# Patient Record
Sex: FEMALE | Race: WHITE | NOT HISPANIC OR LATINO | Employment: OTHER | URBAN - METROPOLITAN AREA
[De-identification: names, ages, dates, MRNs, and addresses within clinical notes are randomized per-mention and may not be internally consistent; named-entity substitution may affect disease eponyms.]

---

## 2024-11-01 ENCOUNTER — APPOINTMENT (OUTPATIENT)
Dept: RADIOLOGY | Facility: HOSPITAL | Age: 34
End: 2024-11-01
Payer: COMMERCIAL

## 2024-11-01 ENCOUNTER — HOSPITAL ENCOUNTER (EMERGENCY)
Facility: HOSPITAL | Age: 34
Discharge: HOME | End: 2024-11-01
Payer: COMMERCIAL

## 2024-11-01 VITALS
OXYGEN SATURATION: 94 % | RESPIRATION RATE: 16 BRPM | HEART RATE: 96 BPM | HEIGHT: 60 IN | WEIGHT: 120 LBS | SYSTOLIC BLOOD PRESSURE: 107 MMHG | BODY MASS INDEX: 23.56 KG/M2 | DIASTOLIC BLOOD PRESSURE: 62 MMHG | TEMPERATURE: 100.4 F

## 2024-11-01 DIAGNOSIS — U07.1 COVID: Primary | ICD-10-CM

## 2024-11-01 LAB
ALBUMIN SERPL BCP-MCNC: 4 G/DL (ref 3.4–5)
ALP SERPL-CCNC: 43 U/L (ref 33–110)
ALT SERPL W P-5'-P-CCNC: 24 U/L (ref 7–45)
ANION GAP SERPL CALC-SCNC: 12 MMOL/L (ref 10–20)
AST SERPL W P-5'-P-CCNC: 20 U/L (ref 9–39)
BASOPHILS # BLD AUTO: 0.01 X10*3/UL (ref 0–0.1)
BASOPHILS NFR BLD AUTO: 0.2 %
BILIRUB SERPL-MCNC: 0.4 MG/DL (ref 0–1.2)
BUN SERPL-MCNC: 8 MG/DL (ref 6–23)
CALCIUM SERPL-MCNC: 8.4 MG/DL (ref 8.6–10.3)
CHLORIDE SERPL-SCNC: 101 MMOL/L (ref 98–107)
CO2 SERPL-SCNC: 27 MMOL/L (ref 21–32)
CREAT SERPL-MCNC: 0.68 MG/DL (ref 0.5–1.05)
EGFRCR SERPLBLD CKD-EPI 2021: >90 ML/MIN/1.73M*2
EOSINOPHIL # BLD AUTO: 0.02 X10*3/UL (ref 0–0.7)
EOSINOPHIL NFR BLD AUTO: 0.5 %
ERYTHROCYTE [DISTWIDTH] IN BLOOD BY AUTOMATED COUNT: 12.8 % (ref 11.5–14.5)
FLUAV RNA RESP QL NAA+PROBE: NOT DETECTED
FLUBV RNA RESP QL NAA+PROBE: NOT DETECTED
GLUCOSE SERPL-MCNC: 94 MG/DL (ref 74–99)
HCT VFR BLD AUTO: 35.3 % (ref 36–46)
HGB BLD-MCNC: 12.1 G/DL (ref 12–16)
HOLD SPECIMEN: NORMAL
HOLD SPECIMEN: NORMAL
IMM GRANULOCYTES # BLD AUTO: 0.02 X10*3/UL (ref 0–0.7)
IMM GRANULOCYTES NFR BLD AUTO: 0.5 % (ref 0–0.9)
LACTATE SERPL-SCNC: 0.9 MMOL/L (ref 0.4–2)
LYMPHOCYTES # BLD AUTO: 0.4 X10*3/UL (ref 1.2–4.8)
LYMPHOCYTES NFR BLD AUTO: 9.4 %
MCH RBC QN AUTO: 31.4 PG (ref 26–34)
MCHC RBC AUTO-ENTMCNC: 34.3 G/DL (ref 32–36)
MCV RBC AUTO: 92 FL (ref 80–100)
MONOCYTES # BLD AUTO: 0.17 X10*3/UL (ref 0.1–1)
MONOCYTES NFR BLD AUTO: 4 %
NEUTROPHILS # BLD AUTO: 3.65 X10*3/UL (ref 1.2–7.7)
NEUTROPHILS NFR BLD AUTO: 85.4 %
NRBC BLD-RTO: 0 /100 WBCS (ref 0–0)
PLATELET # BLD AUTO: 166 X10*3/UL (ref 150–450)
POTASSIUM SERPL-SCNC: 3.3 MMOL/L (ref 3.5–5.3)
PROT SERPL-MCNC: 7.2 G/DL (ref 6.4–8.2)
RBC # BLD AUTO: 3.85 X10*6/UL (ref 4–5.2)
SODIUM SERPL-SCNC: 137 MMOL/L (ref 136–145)
WBC # BLD AUTO: 4.3 X10*3/UL (ref 4.4–11.3)

## 2024-11-01 PROCEDURE — 71046 X-RAY EXAM CHEST 2 VIEWS: CPT | Performed by: RADIOLOGY

## 2024-11-01 PROCEDURE — 87502 INFLUENZA DNA AMP PROBE: CPT | Performed by: NURSE PRACTITIONER

## 2024-11-01 PROCEDURE — 85025 COMPLETE CBC W/AUTO DIFF WBC: CPT | Performed by: NURSE PRACTITIONER

## 2024-11-01 PROCEDURE — 99283 EMERGENCY DEPT VISIT LOW MDM: CPT | Mod: 25

## 2024-11-01 PROCEDURE — 36415 COLL VENOUS BLD VENIPUNCTURE: CPT | Performed by: NURSE PRACTITIONER

## 2024-11-01 PROCEDURE — 2500000002 HC RX 250 W HCPCS SELF ADMINISTERED DRUGS (ALT 637 FOR MEDICARE OP, ALT 636 FOR OP/ED): Performed by: NURSE PRACTITIONER

## 2024-11-01 PROCEDURE — 83605 ASSAY OF LACTIC ACID: CPT | Performed by: NURSE PRACTITIONER

## 2024-11-01 PROCEDURE — 71046 X-RAY EXAM CHEST 2 VIEWS: CPT

## 2024-11-01 PROCEDURE — 2500000001 HC RX 250 WO HCPCS SELF ADMINISTERED DRUGS (ALT 637 FOR MEDICARE OP): Performed by: NURSE PRACTITIONER

## 2024-11-01 PROCEDURE — 80053 COMPREHEN METABOLIC PANEL: CPT | Performed by: NURSE PRACTITIONER

## 2024-11-01 PROCEDURE — 87040 BLOOD CULTURE FOR BACTERIA: CPT | Mod: PARLAB | Performed by: NURSE PRACTITIONER

## 2024-11-01 RX ORDER — IBUPROFEN 400 MG/1
400 TABLET ORAL ONCE
Status: COMPLETED | OUTPATIENT
Start: 2024-11-01 | End: 2024-11-01

## 2024-11-01 RX ORDER — ALBUTEROL SULFATE 90 UG/1
2 INHALANT RESPIRATORY (INHALATION) EVERY 4 HOURS PRN
Qty: 18 G | Refills: 0 | Status: ON HOLD | OUTPATIENT
Start: 2024-11-01 | End: 2024-12-01

## 2024-11-01 RX ORDER — ONDANSETRON 4 MG/1
4 TABLET, ORALLY DISINTEGRATING ORAL EVERY 8 HOURS PRN
Qty: 20 TABLET | Refills: 0 | Status: ON HOLD | OUTPATIENT
Start: 2024-11-01 | End: 2024-11-08

## 2024-11-01 RX ORDER — BENZONATATE 100 MG/1
100 CAPSULE ORAL EVERY 8 HOURS
Qty: 21 CAPSULE | Refills: 0 | Status: ON HOLD | OUTPATIENT
Start: 2024-11-01 | End: 2024-11-08

## 2024-11-01 RX ORDER — POTASSIUM CHLORIDE 20 MEQ/1
40 TABLET, EXTENDED RELEASE ORAL ONCE
Status: COMPLETED | OUTPATIENT
Start: 2024-11-01 | End: 2024-11-01

## 2024-11-01 RX ORDER — ACETAMINOPHEN 325 MG/1
975 TABLET ORAL ONCE
Status: COMPLETED | OUTPATIENT
Start: 2024-11-01 | End: 2024-11-01

## 2024-11-01 ASSESSMENT — COLUMBIA-SUICIDE SEVERITY RATING SCALE - C-SSRS
1. IN THE PAST MONTH, HAVE YOU WISHED YOU WERE DEAD OR WISHED YOU COULD GO TO SLEEP AND NOT WAKE UP?: NO
6. HAVE YOU EVER DONE ANYTHING, STARTED TO DO ANYTHING, OR PREPARED TO DO ANYTHING TO END YOUR LIFE?: NO
2. HAVE YOU ACTUALLY HAD ANY THOUGHTS OF KILLING YOURSELF?: NO

## 2024-11-03 LAB
BACTERIA BLD CULT: NORMAL
BACTERIA BLD CULT: NORMAL

## 2024-11-05 LAB
BACTERIA BLD CULT: NORMAL
BACTERIA BLD CULT: NORMAL

## 2024-11-10 ENCOUNTER — HOSPITAL ENCOUNTER (INPATIENT)
Facility: HOSPITAL | Age: 34
End: 2024-11-10
Attending: EMERGENCY MEDICINE | Admitting: INTERNAL MEDICINE
Payer: COMMERCIAL

## 2024-11-10 ENCOUNTER — APPOINTMENT (OUTPATIENT)
Dept: RADIOLOGY | Facility: HOSPITAL | Age: 34
End: 2024-11-10
Payer: COMMERCIAL

## 2024-11-10 ENCOUNTER — APPOINTMENT (OUTPATIENT)
Dept: CARDIOLOGY | Facility: HOSPITAL | Age: 34
End: 2024-11-10
Payer: COMMERCIAL

## 2024-11-10 VITALS
DIASTOLIC BLOOD PRESSURE: 72 MMHG | SYSTOLIC BLOOD PRESSURE: 119 MMHG | HEART RATE: 91 BPM | BODY MASS INDEX: 23.56 KG/M2 | TEMPERATURE: 98.2 F | RESPIRATION RATE: 16 BRPM | OXYGEN SATURATION: 80 % | WEIGHT: 120 LBS | HEIGHT: 60 IN

## 2024-11-10 DIAGNOSIS — J18.9 PNEUMONIA OF BOTH LUNGS DUE TO INFECTIOUS ORGANISM, UNSPECIFIED PART OF LUNG: Primary | ICD-10-CM

## 2024-11-10 LAB
ALBUMIN SERPL BCP-MCNC: 3.1 G/DL (ref 3.4–5)
ALP SERPL-CCNC: 71 U/L (ref 33–110)
ALT SERPL W P-5'-P-CCNC: 30 U/L (ref 7–45)
ANION GAP SERPL CALC-SCNC: 14 MMOL/L (ref 10–20)
AST SERPL W P-5'-P-CCNC: 36 U/L (ref 9–39)
B-HCG SERPL-ACNC: <2 MIU/ML
BASOPHILS # BLD AUTO: 0.02 X10*3/UL (ref 0–0.1)
BASOPHILS NFR BLD AUTO: 0.2 %
BILIRUB SERPL-MCNC: 0.5 MG/DL (ref 0–1.2)
BNP SERPL-MCNC: 69 PG/ML (ref 0–99)
BUN SERPL-MCNC: 5 MG/DL (ref 6–23)
CALCIUM SERPL-MCNC: 8.1 MG/DL (ref 8.6–10.3)
CARDIAC TROPONIN I PNL SERPL HS: 9 NG/L (ref 0–13)
CARDIAC TROPONIN I PNL SERPL HS: 9 NG/L (ref 0–13)
CHLORIDE SERPL-SCNC: 104 MMOL/L (ref 98–107)
CO2 SERPL-SCNC: 26 MMOL/L (ref 21–32)
CREAT SERPL-MCNC: 0.55 MG/DL (ref 0.5–1.05)
EGFRCR SERPLBLD CKD-EPI 2021: >90 ML/MIN/1.73M*2
EOSINOPHIL # BLD AUTO: 0.09 X10*3/UL (ref 0–0.7)
EOSINOPHIL NFR BLD AUTO: 1 %
ERYTHROCYTE [DISTWIDTH] IN BLOOD BY AUTOMATED COUNT: 13.5 % (ref 11.5–14.5)
FLUAV RNA RESP QL NAA+PROBE: NOT DETECTED
FLUBV RNA RESP QL NAA+PROBE: NOT DETECTED
GLUCOSE SERPL-MCNC: 81 MG/DL (ref 74–99)
HCT VFR BLD AUTO: 31.8 % (ref 36–46)
HGB BLD-MCNC: 10.4 G/DL (ref 12–16)
IMM GRANULOCYTES # BLD AUTO: 0.04 X10*3/UL (ref 0–0.7)
IMM GRANULOCYTES NFR BLD AUTO: 0.5 % (ref 0–0.9)
LYMPHOCYTES # BLD AUTO: 0.93 X10*3/UL (ref 1.2–4.8)
LYMPHOCYTES NFR BLD AUTO: 10.5 %
MAGNESIUM SERPL-MCNC: 2.14 MG/DL (ref 1.6–2.4)
MCH RBC QN AUTO: 31.3 PG (ref 26–34)
MCHC RBC AUTO-ENTMCNC: 32.7 G/DL (ref 32–36)
MCV RBC AUTO: 96 FL (ref 80–100)
MONOCYTES # BLD AUTO: 0.32 X10*3/UL (ref 0.1–1)
MONOCYTES NFR BLD AUTO: 3.6 %
NEUTROPHILS # BLD AUTO: 7.47 X10*3/UL (ref 1.2–7.7)
NEUTROPHILS NFR BLD AUTO: 84.2 %
NRBC BLD-RTO: 0 /100 WBCS (ref 0–0)
PLATELET # BLD AUTO: 458 X10*3/UL (ref 150–450)
POTASSIUM SERPL-SCNC: 4.7 MMOL/L (ref 3.5–5.3)
PROT SERPL-MCNC: 6.1 G/DL (ref 6.4–8.2)
RBC # BLD AUTO: 3.32 X10*6/UL (ref 4–5.2)
SARS-COV-2 RNA RESP QL NAA+PROBE: NOT DETECTED
SODIUM SERPL-SCNC: 139 MMOL/L (ref 136–145)
WBC # BLD AUTO: 8.9 X10*3/UL (ref 4.4–11.3)

## 2024-11-10 PROCEDURE — 71045 X-RAY EXAM CHEST 1 VIEW: CPT

## 2024-11-10 PROCEDURE — 93005 ELECTROCARDIOGRAM TRACING: CPT

## 2024-11-10 PROCEDURE — 36415 COLL VENOUS BLD VENIPUNCTURE: CPT

## 2024-11-10 PROCEDURE — 83735 ASSAY OF MAGNESIUM: CPT

## 2024-11-10 PROCEDURE — 2500000004 HC RX 250 GENERAL PHARMACY W/ HCPCS (ALT 636 FOR OP/ED)

## 2024-11-10 PROCEDURE — 96365 THER/PROPH/DIAG IV INF INIT: CPT

## 2024-11-10 PROCEDURE — 2500000004 HC RX 250 GENERAL PHARMACY W/ HCPCS (ALT 636 FOR OP/ED): Performed by: EMERGENCY MEDICINE

## 2024-11-10 PROCEDURE — 2500000001 HC RX 250 WO HCPCS SELF ADMINISTERED DRUGS (ALT 637 FOR MEDICARE OP): Performed by: NURSE PRACTITIONER

## 2024-11-10 PROCEDURE — 84484 ASSAY OF TROPONIN QUANT: CPT

## 2024-11-10 PROCEDURE — 86225 DNA ANTIBODY NATIVE: CPT | Mod: PARLAB | Performed by: STUDENT IN AN ORGANIZED HEALTH CARE EDUCATION/TRAINING PROGRAM

## 2024-11-10 PROCEDURE — 2500000004 HC RX 250 GENERAL PHARMACY W/ HCPCS (ALT 636 FOR OP/ED): Performed by: NURSE PRACTITIONER

## 2024-11-10 PROCEDURE — 87636 SARSCOV2 & INF A&B AMP PRB: CPT

## 2024-11-10 PROCEDURE — 2500000001 HC RX 250 WO HCPCS SELF ADMINISTERED DRUGS (ALT 637 FOR MEDICARE OP)

## 2024-11-10 PROCEDURE — 99223 1ST HOSP IP/OBS HIGH 75: CPT | Performed by: NURSE PRACTITIONER

## 2024-11-10 PROCEDURE — 71275 CT ANGIOGRAPHY CHEST: CPT

## 2024-11-10 PROCEDURE — 71275 CT ANGIOGRAPHY CHEST: CPT | Performed by: STUDENT IN AN ORGANIZED HEALTH CARE EDUCATION/TRAINING PROGRAM

## 2024-11-10 PROCEDURE — 96375 TX/PRO/DX INJ NEW DRUG ADDON: CPT

## 2024-11-10 PROCEDURE — 99285 EMERGENCY DEPT VISIT HI MDM: CPT | Mod: 25

## 2024-11-10 PROCEDURE — 2550000001 HC RX 255 CONTRASTS: Performed by: EMERGENCY MEDICINE

## 2024-11-10 PROCEDURE — 80053 COMPREHEN METABOLIC PANEL: CPT

## 2024-11-10 PROCEDURE — 1200000002 HC GENERAL ROOM WITH TELEMETRY DAILY

## 2024-11-10 PROCEDURE — 84132 ASSAY OF SERUM POTASSIUM: CPT

## 2024-11-10 PROCEDURE — 84702 CHORIONIC GONADOTROPIN TEST: CPT

## 2024-11-10 PROCEDURE — 71045 X-RAY EXAM CHEST 1 VIEW: CPT | Performed by: RADIOLOGY

## 2024-11-10 PROCEDURE — 86038 ANTINUCLEAR ANTIBODIES: CPT | Mod: PARLAB | Performed by: STUDENT IN AN ORGANIZED HEALTH CARE EDUCATION/TRAINING PROGRAM

## 2024-11-10 PROCEDURE — 85025 COMPLETE CBC W/AUTO DIFF WBC: CPT

## 2024-11-10 PROCEDURE — 83880 ASSAY OF NATRIURETIC PEPTIDE: CPT

## 2024-11-10 RX ORDER — ONDANSETRON 4 MG/1
4 TABLET, ORALLY DISINTEGRATING ORAL EVERY 8 HOURS PRN
Status: ACTIVE | OUTPATIENT
Start: 2024-11-10

## 2024-11-10 RX ORDER — L. ACIDOPHILUS/L.BULGARICUS 1MM CELL
1 TABLET ORAL 2 TIMES DAILY
Status: DISPENSED | OUTPATIENT
Start: 2024-11-10

## 2024-11-10 RX ORDER — IPRATROPIUM BROMIDE AND ALBUTEROL SULFATE 2.5; .5 MG/3ML; MG/3ML
3 SOLUTION RESPIRATORY (INHALATION)
Status: DISCONTINUED | OUTPATIENT
Start: 2024-11-10 | End: 2024-11-10

## 2024-11-10 RX ORDER — ACETAMINOPHEN 325 MG/1
975 TABLET ORAL ONCE
Status: COMPLETED | OUTPATIENT
Start: 2024-11-10 | End: 2024-11-10

## 2024-11-10 RX ORDER — GUAIFENESIN 600 MG/1
600 TABLET, EXTENDED RELEASE ORAL 2 TIMES DAILY
Status: DISPENSED | OUTPATIENT
Start: 2024-11-10

## 2024-11-10 RX ORDER — IPRATROPIUM BROMIDE AND ALBUTEROL SULFATE 2.5; .5 MG/3ML; MG/3ML
3 SOLUTION RESPIRATORY (INHALATION) EVERY 2 HOUR PRN
Status: ACTIVE | OUTPATIENT
Start: 2024-11-10

## 2024-11-10 RX ORDER — ALBUTEROL SULFATE 0.83 MG/ML
3 SOLUTION RESPIRATORY (INHALATION) EVERY 2 HOUR PRN
Status: ACTIVE | OUTPATIENT
Start: 2024-11-10

## 2024-11-10 RX ORDER — CEFTRIAXONE 2 G/50ML
2 INJECTION, SOLUTION INTRAVENOUS EVERY 24 HOURS
Status: ACTIVE | OUTPATIENT
Start: 2024-11-11

## 2024-11-10 RX ORDER — ALBUTEROL SULFATE 0.83 MG/ML
3 SOLUTION RESPIRATORY (INHALATION) EVERY 4 HOURS PRN
Status: DISCONTINUED | OUTPATIENT
Start: 2024-11-10 | End: 2024-11-10

## 2024-11-10 RX ORDER — BENZONATATE 100 MG/1
100 CAPSULE ORAL EVERY 8 HOURS PRN
Status: ACTIVE | OUTPATIENT
Start: 2024-11-10

## 2024-11-10 RX ORDER — CEFTRIAXONE 1 G/50ML
1 INJECTION, SOLUTION INTRAVENOUS ONCE
Status: DISCONTINUED | OUTPATIENT
Start: 2024-11-10 | End: 2024-11-10

## 2024-11-10 RX ORDER — CEFTRIAXONE 2 G/50ML
2 INJECTION, SOLUTION INTRAVENOUS ONCE
Status: COMPLETED | OUTPATIENT
Start: 2024-11-10 | End: 2024-11-10

## 2024-11-10 RX ORDER — ENOXAPARIN SODIUM 100 MG/ML
40 INJECTION SUBCUTANEOUS DAILY
Status: ACTIVE | OUTPATIENT
Start: 2024-11-10

## 2024-11-10 RX ORDER — DEXAMETHASONE SODIUM PHOSPHATE 10 MG/ML
6 INJECTION INTRAMUSCULAR; INTRAVENOUS EVERY 24 HOURS
Status: DISPENSED | OUTPATIENT
Start: 2024-11-10

## 2024-11-10 RX ADMIN — DEXAMETHASONE SODIUM PHOSPHATE 6 MG: 10 INJECTION INTRAMUSCULAR; INTRAVENOUS at 21:22

## 2024-11-10 RX ADMIN — GUAIFENESIN 600 MG: 600 TABLET, EXTENDED RELEASE ORAL at 21:22

## 2024-11-10 RX ADMIN — ACETAMINOPHEN 975 MG: 325 TABLET, FILM COATED ORAL at 17:08

## 2024-11-10 RX ADMIN — AZITHROMYCIN DIHYDRATE 500 MG: 500 INJECTION, POWDER, LYOPHILIZED, FOR SOLUTION INTRAVENOUS at 17:08

## 2024-11-10 RX ADMIN — CEFTRIAXONE SODIUM 2 G: 2 INJECTION, SOLUTION INTRAVENOUS at 17:08

## 2024-11-10 RX ADMIN — IOHEXOL 75 ML: 350 INJECTION, SOLUTION INTRAVENOUS at 17:10

## 2024-11-10 RX ADMIN — Medication 1 TABLET: at 21:22

## 2024-11-10 SDOH — SOCIAL STABILITY: SOCIAL INSECURITY
WITHIN THE LAST YEAR, HAVE YOU BEEN RAPED OR FORCED TO HAVE ANY KIND OF SEXUAL ACTIVITY BY YOUR PARTNER OR EX-PARTNER?: NO

## 2024-11-10 SDOH — SOCIAL STABILITY: SOCIAL INSECURITY: WITHIN THE LAST YEAR, HAVE YOU BEEN AFRAID OF YOUR PARTNER OR EX-PARTNER?: NO

## 2024-11-10 SDOH — SOCIAL STABILITY: SOCIAL INSECURITY: DOES ANYONE TRY TO KEEP YOU FROM HAVING/CONTACTING OTHER FRIENDS OR DOING THINGS OUTSIDE YOUR HOME?: NO

## 2024-11-10 SDOH — ECONOMIC STABILITY: FOOD INSECURITY: WITHIN THE PAST 12 MONTHS, YOU WORRIED THAT YOUR FOOD WOULD RUN OUT BEFORE YOU GOT THE MONEY TO BUY MORE.: NEVER TRUE

## 2024-11-10 SDOH — ECONOMIC STABILITY: INCOME INSECURITY: IN THE PAST 12 MONTHS HAS THE ELECTRIC, GAS, OIL, OR WATER COMPANY THREATENED TO SHUT OFF SERVICES IN YOUR HOME?: NO

## 2024-11-10 SDOH — SOCIAL STABILITY: SOCIAL INSECURITY: HAVE YOU HAD ANY THOUGHTS OF HARMING ANYONE ELSE?: NO

## 2024-11-10 SDOH — SOCIAL STABILITY: SOCIAL INSECURITY: HAVE YOU HAD THOUGHTS OF HARMING ANYONE ELSE?: NO

## 2024-11-10 SDOH — SOCIAL STABILITY: SOCIAL INSECURITY: WITHIN THE LAST YEAR, HAVE YOU BEEN HUMILIATED OR EMOTIONALLY ABUSED IN OTHER WAYS BY YOUR PARTNER OR EX-PARTNER?: NO

## 2024-11-10 SDOH — SOCIAL STABILITY: SOCIAL INSECURITY
WITHIN THE LAST YEAR, HAVE YOU BEEN KICKED, HIT, SLAPPED, OR OTHERWISE PHYSICALLY HURT BY YOUR PARTNER OR EX-PARTNER?: NO

## 2024-11-10 SDOH — SOCIAL STABILITY: SOCIAL INSECURITY: ABUSE: ADULT

## 2024-11-10 SDOH — ECONOMIC STABILITY: FOOD INSECURITY: WITHIN THE PAST 12 MONTHS, THE FOOD YOU BOUGHT JUST DIDN'T LAST AND YOU DIDN'T HAVE MONEY TO GET MORE.: NEVER TRUE

## 2024-11-10 SDOH — SOCIAL STABILITY: SOCIAL INSECURITY: WERE YOU ABLE TO COMPLETE ALL THE BEHAVIORAL HEALTH SCREENINGS?: YES

## 2024-11-10 SDOH — SOCIAL STABILITY: SOCIAL INSECURITY: ARE THERE ANY APPARENT SIGNS OF INJURIES/BEHAVIORS THAT COULD BE RELATED TO ABUSE/NEGLECT?: NO

## 2024-11-10 SDOH — SOCIAL STABILITY: SOCIAL INSECURITY: ARE YOU OR HAVE YOU BEEN THREATENED OR ABUSED PHYSICALLY, EMOTIONALLY, OR SEXUALLY BY ANYONE?: NO

## 2024-11-10 SDOH — SOCIAL STABILITY: SOCIAL INSECURITY: DO YOU FEEL ANYONE HAS EXPLOITED OR TAKEN ADVANTAGE OF YOU FINANCIALLY OR OF YOUR PERSONAL PROPERTY?: NO

## 2024-11-10 SDOH — SOCIAL STABILITY: SOCIAL INSECURITY: HAS ANYONE EVER THREATENED TO HURT YOUR FAMILY OR YOUR PETS?: NO

## 2024-11-10 SDOH — SOCIAL STABILITY: SOCIAL INSECURITY: DO YOU FEEL UNSAFE GOING BACK TO THE PLACE WHERE YOU ARE LIVING?: NO

## 2024-11-10 ASSESSMENT — PAIN - FUNCTIONAL ASSESSMENT
PAIN_FUNCTIONAL_ASSESSMENT: 0-10
PAIN_FUNCTIONAL_ASSESSMENT: 0-10

## 2024-11-10 ASSESSMENT — LIFESTYLE VARIABLES
AUDIT-C TOTAL SCORE: 0
SKIP TO QUESTIONS 9-10: 1
HOW OFTEN DO YOU HAVE A DRINK CONTAINING ALCOHOL: NEVER
HOW MANY STANDARD DRINKS CONTAINING ALCOHOL DO YOU HAVE ON A TYPICAL DAY: PATIENT DOES NOT DRINK
AUDIT-C TOTAL SCORE: 0
HOW OFTEN DO YOU HAVE 6 OR MORE DRINKS ON ONE OCCASION: NEVER

## 2024-11-10 ASSESSMENT — ACTIVITIES OF DAILY LIVING (ADL)
DRESSING YOURSELF: INDEPENDENT
HEARING - LEFT EAR: FUNCTIONAL
HEARING - RIGHT EAR: FUNCTIONAL
WALKS IN HOME: INDEPENDENT
PATIENT'S MEMORY ADEQUATE TO SAFELY COMPLETE DAILY ACTIVITIES?: YES
BATHING: INDEPENDENT
FEEDING YOURSELF: INDEPENDENT
JUDGMENT_ADEQUATE_SAFELY_COMPLETE_DAILY_ACTIVITIES: YES
TOILETING: INDEPENDENT
GROOMING: INDEPENDENT
LACK_OF_TRANSPORTATION: NO
ADEQUATE_TO_COMPLETE_ADL: YES

## 2024-11-10 ASSESSMENT — COGNITIVE AND FUNCTIONAL STATUS - GENERAL
MOBILITY SCORE: 24
DAILY ACTIVITIY SCORE: 24
PATIENT BASELINE BEDBOUND: NO

## 2024-11-10 ASSESSMENT — PAIN DESCRIPTION - PAIN TYPE: TYPE: ACUTE PAIN

## 2024-11-10 ASSESSMENT — PATIENT HEALTH QUESTIONNAIRE - PHQ9
2. FEELING DOWN, DEPRESSED OR HOPELESS: NOT AT ALL
SUM OF ALL RESPONSES TO PHQ9 QUESTIONS 1 & 2: 0
1. LITTLE INTEREST OR PLEASURE IN DOING THINGS: NOT AT ALL

## 2024-11-10 ASSESSMENT — COLUMBIA-SUICIDE SEVERITY RATING SCALE - C-SSRS
1. IN THE PAST MONTH, HAVE YOU WISHED YOU WERE DEAD OR WISHED YOU COULD GO TO SLEEP AND NOT WAKE UP?: NO
2. HAVE YOU ACTUALLY HAD ANY THOUGHTS OF KILLING YOURSELF?: NO
6. HAVE YOU EVER DONE ANYTHING, STARTED TO DO ANYTHING, OR PREPARED TO DO ANYTHING TO END YOUR LIFE?: NO

## 2024-11-10 ASSESSMENT — PAIN SCALES - GENERAL
PAINLEVEL_OUTOF10: 0 - NO PAIN
PAINLEVEL_OUTOF10: 0 - NO PAIN

## 2024-11-10 NOTE — ED PROVIDER NOTES
Emergency Department Provider Note        History of Present Illness     CC: Shortness of Breath     HPI:  This is a 34-year-old female who presents to the emergency department with shortness of breath.  She states that she was diagnosed with COVID a couple of weeks ago and since then has been dealing with daily fevers as high as 103 °F.  She has been rotating between Tylenol and ibuprofen every day sick and control her symptoms and her fever.  She has had worsening cough and shortness of breath.  Complains of shortness of breath mostly while she is up moving around or during conversation.  Denies any significant chest pain or back pain.  Denies any nausea or vomiting.  Denies any abdominal pain.    Limitations to history: None  Independent historian(s): Patient's mother at bedside  Records Reviewed: Recent available ED and inpatient notes reviewed in EMR.    PMHx/PSHx:  Per HPI.   - has no past medical history on file.  - has no past surgical history on file.    Medications:  Reviewed in EMR. See EMR for complete list of medications and doses.    Allergies:  Penicillins    Social History:  - Tobacco:  has no history on file for tobacco use.   - Alcohol:  has no history on file for alcohol use.   - Illicit Drugs:  has no history on file for drug use.     ROS:  Per HPI.       Physical Exam     Triage Vitals:  T 37.2 °C (99 °F)  HR (!) 101  /68  RR 20  O2 (!) 88 %      General: Patient resting in bed, no acute distress, breathing easily, well appearing, and appropriately conversational without confusion or gross mental status changes.  Head: Normocephalic. Atraumatic.  Neck: No meningismus.  FROM. No gross masses.   Eyes: EOMI. No scleral icterus or injection.  ENT: Moist mucous membranes, no apparent trauma or lesions.  CV: Regular rhythm. No murmurs, rubs, gallops appreciated. 2+ radial pulses bilaterally.  Resp: Mild to moderate respiratory distress with conversation, diminished breath sounds throughout  but no focalizing sounds including wheezing or crackles.  GI: Soft, non-distended.  No tenderness with palpation.    EXT: No peripheral edema, contusions, or wounds.  Skin: Warm and dry, no rashes or lesions.  Neuro: Alert and oriented.  No focal neurological deficits.  Equal motor strength in bilateral upper and lower extremities.  Sensation intact throughout.  Speech fluent.      Medical Decision Making & ED Course     Labs:   Labs Reviewed   CBC WITH AUTO DIFFERENTIAL   COMPREHENSIVE METABOLIC PANEL   MAGNESIUM   BLOOD GAS VENOUS FULL PANEL   TROPONIN SERIES- (INITIAL, 1 HR)    Narrative:     The following orders were created for panel order Troponin I Series, High Sensitivity (0, 1 HR).  Procedure                               Abnormality         Status                     ---------                               -----------         ------                     Troponin I, High Sensiti...[[id number]]                                                   Please view results for these tests on the individual orders.   B-TYPE NATRIURETIC PEPTIDE   INFLUENZA A AND B PCR   SARS-COV-2 PCR   HUMAN CHORIONIC GONADOTROPIN, SERUM QUANTITATIVE   SERIAL TROPONIN-INITIAL        Imaging:   CT angio chest for pulmonary embolism    (Results Pending)        EKG:  15: 03: Rate of 99 bpm, regular rhythm, normal axis, normal intervals, no evidence of ST segment elevations or depressions, no pattern T wave abnormalities.    MDM:  This is a 34-year-old female who presents to the emergency department shortness of breath.  She is hemodynamically stable upon arrival.  Initial vital signs concerning for heart rate of 101 and a pulse ox of 88% on room air.  She is hemodynamically stable and afebrile.  On evaluation she gets dyspneic with conversation however her lung exam is only significant for diminished breath sounds without any crackles, wheezing, or other focalizing sounds.  Differential considered includes ongoing COVID-pneumonia, bacterial  pneumonia, pulmonary embolism.  Workup initiated with labs and imaging.  There is no leukocytosis and a mild normocytic anemia not requiring transfusion today.  Metabolic panel is without any significant electrolyte abnormalities.  BNP and troponins are normal.  Beta quant is negative.  Viral testing for COVID and flu are negative.  Chest x-ray was concerning for a large left-sided pneumonia.  CT PE was obtained that confirmed multifocal area of pneumonia without any obvious blood clot.  Patient did spike a fever while here in the emergency department and received acetaminophen.  She continued to be hypoxic on room air and required a couple of liters nasal cannula in order to maintain oxygen saturations.  At this time she is appropriate for admission given her worsening pneumonia and ongoing hypoxia.  She is agreeable.  Hospitalist contacted and the patient is accepted to medicine.  She remained hemodynamically stable on supplemental O2 and awaits transfer to regular nursing floor.      ED Course:  Diagnoses as of 11/10/24 1839   Pneumonia of both lungs due to infectious organism, unspecified part of lung       Independent Result Review and Interpretation: Relevant laboratory and radiographic results were reviewed and independently interpreted by myself.  As necessary, they are commented on in the ED Course.    Social Determinants Limiting Care:  None identified      Patient seen by and discussed with the attending emergency medicine physician.       Disposition    Admit-medicine    Rudy Bateman DO   Emergency Medicine PGY-3  [facility]      Procedures      Procedures ? SmartLinks last updated 11/10/2024 3:43 PM        Rudy Bateman DO  Resident  11/10/24 5633

## 2024-11-10 NOTE — ED TRIAGE NOTES
Pt to ED c/o fever, states 103f around 1100 today , pt c/o SOB with exertion. Pt c/o pain on right lower abd when coughing. Pt states clear sputum when coughing. Pt states she took ibuprofen around 1100 for fever.   Covid test + 14 days ago.

## 2024-11-11 LAB
ALBUMIN SERPL BCP-MCNC: 3.1 G/DL (ref 3.4–5)
ALP SERPL-CCNC: 62 U/L (ref 33–110)
ALT SERPL W P-5'-P-CCNC: 28 U/L (ref 7–45)
ANION GAP BLDV CALCULATED.4IONS-SCNC: 10 MMOL/L (ref 10–25)
ANION GAP SERPL CALC-SCNC: 11 MMOL/L (ref 10–20)
AST SERPL W P-5'-P-CCNC: 28 U/L (ref 9–39)
BASE EXCESS BLDV CALC-SCNC: 2.3 MMOL/L (ref -2–3)
BASOPHILS # BLD AUTO: 0.01 X10*3/UL (ref 0–0.1)
BASOPHILS NFR BLD AUTO: 0.1 %
BILIRUB SERPL-MCNC: 0.3 MG/DL (ref 0–1.2)
BODY TEMPERATURE: 37 DEGREES CELSIUS
BUN SERPL-MCNC: 7 MG/DL (ref 6–23)
CA-I BLDV-SCNC: 1.15 MMOL/L (ref 1.1–1.33)
CALCIUM SERPL-MCNC: 8.4 MG/DL (ref 8.6–10.3)
CHLORIDE BLDV-SCNC: 105 MMOL/L (ref 98–107)
CHLORIDE SERPL-SCNC: 104 MMOL/L (ref 98–107)
CO2 SERPL-SCNC: 27 MMOL/L (ref 21–32)
CREAT SERPL-MCNC: 0.59 MG/DL (ref 0.5–1.05)
EGFRCR SERPLBLD CKD-EPI 2021: >90 ML/MIN/1.73M*2
EOSINOPHIL # BLD AUTO: 0 X10*3/UL (ref 0–0.7)
EOSINOPHIL NFR BLD AUTO: 0 %
ERYTHROCYTE [DISTWIDTH] IN BLOOD BY AUTOMATED COUNT: 13.6 % (ref 11.5–14.5)
GLUCOSE BLDV-MCNC: 84 MG/DL (ref 74–99)
GLUCOSE SERPL-MCNC: 129 MG/DL (ref 74–99)
HCO3 BLDV-SCNC: 26.4 MMOL/L (ref 22–26)
HCT VFR BLD AUTO: 29.3 % (ref 36–46)
HCT VFR BLD EST: 32 % (ref 36–46)
HGB BLD-MCNC: 10.3 G/DL (ref 12–16)
HGB BLDV-MCNC: 10.6 G/DL (ref 12–16)
IMM GRANULOCYTES # BLD AUTO: 0.03 X10*3/UL (ref 0–0.7)
IMM GRANULOCYTES NFR BLD AUTO: 0.4 % (ref 0–0.9)
INHALED O2 CONCENTRATION: 21 %
LACTATE BLDV-SCNC: 0.8 MMOL/L (ref 0.4–2)
LYMPHOCYTES # BLD AUTO: 0.53 X10*3/UL (ref 1.2–4.8)
LYMPHOCYTES NFR BLD AUTO: 7.3 %
MCH RBC QN AUTO: 34.1 PG (ref 26–34)
MCHC RBC AUTO-ENTMCNC: 35.2 G/DL (ref 32–36)
MCV RBC AUTO: 97 FL (ref 80–100)
MONOCYTES # BLD AUTO: 0.14 X10*3/UL (ref 0.1–1)
MONOCYTES NFR BLD AUTO: 1.9 %
NEUTROPHILS # BLD AUTO: 6.53 X10*3/UL (ref 1.2–7.7)
NEUTROPHILS NFR BLD AUTO: 90.3 %
NRBC BLD-RTO: 0 /100 WBCS (ref 0–0)
OXYHGB MFR BLDV: 21.4 % (ref 45–75)
PCO2 BLDV: 38 MM HG (ref 41–51)
PH BLDV: 7.45 PH (ref 7.33–7.43)
PLATELET # BLD AUTO: 422 X10*3/UL (ref 150–450)
PO2 BLDV: 18 MM HG (ref 35–45)
POTASSIUM BLDV-SCNC: 4.7 MMOL/L (ref 3.5–5.3)
POTASSIUM SERPL-SCNC: 4.5 MMOL/L (ref 3.5–5.3)
PROT SERPL-MCNC: 6.4 G/DL (ref 6.4–8.2)
RBC # BLD AUTO: 3.02 X10*6/UL (ref 4–5.2)
SAO2 % BLDV: 22 % (ref 45–75)
SODIUM BLDV-SCNC: 137 MMOL/L (ref 136–145)
SODIUM SERPL-SCNC: 137 MMOL/L (ref 136–145)
WBC # BLD AUTO: 7.2 X10*3/UL (ref 4.4–11.3)

## 2024-11-11 PROCEDURE — 84075 ASSAY ALKALINE PHOSPHATASE: CPT | Performed by: NURSE PRACTITIONER

## 2024-11-11 PROCEDURE — 85025 COMPLETE CBC W/AUTO DIFF WBC: CPT | Performed by: NURSE PRACTITIONER

## 2024-11-11 PROCEDURE — 36415 COLL VENOUS BLD VENIPUNCTURE: CPT | Performed by: NURSE PRACTITIONER

## 2024-11-11 PROCEDURE — 1200000002 HC GENERAL ROOM WITH TELEMETRY DAILY

## 2024-11-11 PROCEDURE — 2500000001 HC RX 250 WO HCPCS SELF ADMINISTERED DRUGS (ALT 637 FOR MEDICARE OP): Performed by: NURSE PRACTITIONER

## 2024-11-11 PROCEDURE — 2500000004 HC RX 250 GENERAL PHARMACY W/ HCPCS (ALT 636 FOR OP/ED): Performed by: NURSE PRACTITIONER

## 2024-11-11 RX ADMIN — AZITHROMYCIN DIHYDRATE 500 MG: 500 INJECTION, POWDER, LYOPHILIZED, FOR SOLUTION INTRAVENOUS at 16:24

## 2024-11-11 RX ADMIN — Medication 1 TABLET: at 09:35

## 2024-11-11 RX ADMIN — CEFTRIAXONE SODIUM 2 G: 2 INJECTION, SOLUTION INTRAVENOUS at 17:35

## 2024-11-11 RX ADMIN — GUAIFENESIN 600 MG: 600 TABLET, EXTENDED RELEASE ORAL at 20:54

## 2024-11-11 RX ADMIN — GUAIFENESIN 600 MG: 600 TABLET, EXTENDED RELEASE ORAL at 09:35

## 2024-11-11 RX ADMIN — ENOXAPARIN SODIUM 40 MG: 40 INJECTION SUBCUTANEOUS at 09:35

## 2024-11-11 RX ADMIN — BENZONATATE 100 MG: 100 CAPSULE ORAL at 06:19

## 2024-11-11 RX ADMIN — Medication 1 TABLET: at 20:54

## 2024-11-11 RX ADMIN — DEXAMETHASONE SODIUM PHOSPHATE 6 MG: 10 INJECTION INTRAMUSCULAR; INTRAVENOUS at 20:53

## 2024-11-11 ASSESSMENT — COGNITIVE AND FUNCTIONAL STATUS - GENERAL
MOBILITY SCORE: 24
DAILY ACTIVITIY SCORE: 24

## 2024-11-11 ASSESSMENT — PAIN SCALES - GENERAL
PAINLEVEL_OUTOF10: 0 - NO PAIN
PAINLEVEL_OUTOF10: 0 - NO PAIN

## 2024-11-11 ASSESSMENT — PAIN - FUNCTIONAL ASSESSMENT: PAIN_FUNCTIONAL_ASSESSMENT: 0-10

## 2024-11-11 NOTE — H&P
History Of Present Illness  Negar Arboleda is a 34 y.o. femaleWho denies any medical history who tested positive for COVID on  and presented to the ED on  with COVID symptoms, tested positive, and was discharged.  She presents today with persistent fevers.  Patient reports that since  she has been experiencing fevers, initially a dry cough that has now turned productive, shortness of breath, weakness, and decreased appetite.  She reports the fevers are as high as 103 and despite treating them with over-the-counter medications she has not been able to go without a fever so she came back to the emergency room for evaluation.  She admits she has never had a COVID-vaccine.    In the ED lab work, EKG, CTA chest, and chest x-ray were performed. Labs revealed white count up to 8.9 from 4.3 on , H&H 10.4 and 31.8 (12.1 and 35.3 on 2024), platelets 458, negative for flu and COVID.  Pregnancy test was negative.  EKG, per ER physician impression revealed Rate of 99 bpm, regular rhythm, normal axis, normal intervals, no evidence of ST segment elevations or depressions, no pattern T wave abnormalities.  Chest x-ray revealed worsening bilateral airspace opacities concerning for worsening multifocal pneumonia, blunting of the left costophrenic angle suggestive of developing effusion and atelectasis continued radiographic follow-up to resolution is recommended.  CTA chest is pending.  Troponins were negative x 2.  She arrived tachycardic with a heart rate of 101, a low-grade fever of 37 2, and an SpO2 of 88%, blood pressure and respirations were within normal limits.  She was given Tylenol Zithromax and Rocephin and admitted for further evaluation and treatment under the care of Dr. Balbuena.     Past medical history: Denies  Surgical history:  x 3  Social history: Reports she smoked up until the point she became ill and has not smoked since, denies alcohol or illicit drug  use.  Family history: Heart disease  A 10 point review of systems has been performed and is negative besides what is stated in HPI.    Allergies  Penicillins       Physical Exam  Constitutional:       Appearance: Normal appearance.   HENT:      Head: Normocephalic and atraumatic.      Mouth/Throat:      Mouth: Mucous membranes are moist.   Eyes:      Conjunctiva/sclera: Conjunctivae normal.   Cardiovascular:      Rate and Rhythm: Normal rate and regular rhythm.      Heart sounds: Normal heart sounds.      Comments: Difficult to hear over noisy chest  Pulmonary:      Effort: Pulmonary effort is normal.      Breath sounds: Wheezing, rhonchi and rales present.   Abdominal:      General: There is no distension.      Palpations: Abdomen is soft.      Tenderness: There is no abdominal tenderness.      Comments: Hyperactive bowel sounds   Musculoskeletal:         General: Normal range of motion.      Cervical back: Neck supple.   Skin:     General: Skin is warm and dry.   Neurological:      Mental Status: She is alert. Mental status is at baseline.   Psychiatric:         Mood and Affect: Mood normal.          Last Recorded Vitals  Blood pressure 106/64, pulse 100, temperature 37.9 °C (100.2 °F), resp. rate 16, height 1.524 m (5'), weight 54.4 kg (120 lb), SpO2 94%.    Relevant Results     Scheduled medications  azithromycin, 500 mg, intravenous, q24h  benzonatate, 100 mg, oral, q8h  cefTRIAXone, 2 g, intravenous, q24h  dexAMETHasone, 6 mg, intravenous, q24h  guaiFENesin, 600 mg, oral, BID  ipratropium-albuteroL, 3 mL, nebulization, q6h  lactobacillus acidophilus, 1 tablet, oral, BID  remdesivir, 200 mg, intravenous, Once   Followed by  [START ON 11/11/2024] remdesivir, 100 mg, intravenous, q24h      Continuous medications     PRN medications  PRN medications: albuterol, ondansetron ODT, oxygen     Results for orders placed or performed during the hospital encounter of 11/10/24 (from the past 24 hours)   Influenza A, and B  PCR   Result Value Ref Range    Flu A Result Not Detected Not Detected    Flu B Result Not Detected Not Detected   Sars-CoV-2 PCR   Result Value Ref Range    Coronavirus 2019, PCR Not Detected Not Detected   CBC and Auto Differential   Result Value Ref Range    WBC 8.9 4.4 - 11.3 x10*3/uL    nRBC 0.0 0.0 - 0.0 /100 WBCs    RBC 3.32 (L) 4.00 - 5.20 x10*6/uL    Hemoglobin 10.4 (L) 12.0 - 16.0 g/dL    Hematocrit 31.8 (L) 36.0 - 46.0 %    MCV 96 80 - 100 fL    MCH 31.3 26.0 - 34.0 pg    MCHC 32.7 32.0 - 36.0 g/dL    RDW 13.5 11.5 - 14.5 %    Platelets 458 (H) 150 - 450 x10*3/uL    Neutrophils % 84.2 40.0 - 80.0 %    Immature Granulocytes %, Automated 0.5 0.0 - 0.9 %    Lymphocytes % 10.5 13.0 - 44.0 %    Monocytes % 3.6 2.0 - 10.0 %    Eosinophils % 1.0 0.0 - 6.0 %    Basophils % 0.2 0.0 - 2.0 %    Neutrophils Absolute 7.47 1.20 - 7.70 x10*3/uL    Immature Granulocytes Absolute, Automated 0.04 0.00 - 0.70 x10*3/uL    Lymphocytes Absolute 0.93 (L) 1.20 - 4.80 x10*3/uL    Monocytes Absolute 0.32 0.10 - 1.00 x10*3/uL    Eosinophils Absolute 0.09 0.00 - 0.70 x10*3/uL    Basophils Absolute 0.02 0.00 - 0.10 x10*3/uL   Comprehensive metabolic panel   Result Value Ref Range    Glucose 81 74 - 99 mg/dL    Sodium 139 136 - 145 mmol/L    Potassium 4.7 3.5 - 5.3 mmol/L    Chloride 104 98 - 107 mmol/L    Bicarbonate 26 21 - 32 mmol/L    Anion Gap 14 10 - 20 mmol/L    Urea Nitrogen 5 (L) 6 - 23 mg/dL    Creatinine 0.55 0.50 - 1.05 mg/dL    eGFR >90 >60 mL/min/1.73m*2    Calcium 8.1 (L) 8.6 - 10.3 mg/dL    Albumin 3.1 (L) 3.4 - 5.0 g/dL    Alkaline Phosphatase 71 33 - 110 U/L    Total Protein 6.1 (L) 6.4 - 8.2 g/dL    AST 36 9 - 39 U/L    Bilirubin, Total 0.5 0.0 - 1.2 mg/dL    ALT 30 7 - 45 U/L   Magnesium   Result Value Ref Range    Magnesium 2.14 1.60 - 2.40 mg/dL   B-Type Natriuretic Peptide   Result Value Ref Range    BNP 69 0 - 99 pg/mL   hCG, quantitative, pregnancy   Result Value Ref Range    HCG, Beta-Quantitative <2 <5  mIU/mL   Troponin I, High Sensitivity, Initial   Result Value Ref Range    Troponin I, High Sensitivity 9 0 - 13 ng/L   Troponin, High Sensitivity, 1 Hour   Result Value Ref Range    Troponin I, High Sensitivity 9 0 - 13 ng/L        CT angio chest for pulmonary embolism    Result Date: 11/10/2024  Interpreted By:  Mahesh Tinsley, STUDY: CT ANGIO CHEST FOR PULMONARY EMBOLISM;  11/10/2024 5:10 pm   INDICATION: Signs/Symptoms:short of breath, hypoxic, recent covid.     COMPARISON: None.   ACCESSION NUMBER(S): [id number]   ORDERING CLINICIAN: KUNAL BECERRA   TECHNIQUE: Contiguous axial images of the chest were obtained after the intravenous administration of iodinated contrast using angiographic PE protocol. Coronal and sagittal reformatted images were reconstructed from the axial data. MIP images were created on an independent workstation and reviewed.   FINDINGS:   MEDIASTINUM AND LYMPH NODES:  The esophageal wall appears within normal limits. Scattered prominent left hilar and left paratracheal lymph nodes are noted, for example measuring up to 1.3 cm in the left lower paratracheal location.  No pneumomediastinum.   VESSELS:  Normal caliber thoracic aorta without dissection. No significant aortic atherosclerosis.  No acute pulmonary embolism.   HEART: Normal in size.  No coronary artery calcifications. No significant pericardial effusion.   LUNG, AIRWAYS, AND PLEURA: There is dense consolidation in the lingula with air bronchograms. There is diffuse ground-glass and tree-in-bud opacity throughout the remainder of the left upper lobe. There is also multifocal and diffuse areas of tree-in-bud opacity throughout the bilateral lower lobes and posterior segment of the right upper lobe. There is also mild tree-in-bud ground-glass opacities within the right middle lobe. There are bilateral pleural effusions, small on the right and moderate in size on the left. No pneumothorax. There is a 4.1 cm x 2.1 cm pulmonary  cyst/bleb within the medial aspect of the lingula. There is no fluid level to suggest superimposed cyst infection.   OSSEOUS STRUCTURES: No acute osseous abnormality.   CHEST WALL SOFT TISSUES: No discernible abnormality.   UPPER ABDOMEN/OTHER: No acute abnormality.       Extensive pneumonia within all lobes of both lungs, most severe within the lingula. Bilateral small parapneumonic pleural effusions (left > right).   4.1 cm x 2.1 cm pulmonary cyst in the medial left lingula that could predispose to spontaneous pneumothorax.   No acute pulmonary embolism.   MACRO: None.   Signed by: Mahesh Tinsley 11/10/2024 7:02 PM Dictation workstation:   [id number]    XR chest 1 view    Result Date: 11/10/2024  Interpreted By:  Ankita Chanel, STUDY: XR CHEST 1 VIEW;  11/10/2024 4:44 pm   INDICATION: Signs/Symptoms:short of breath, hypoxic.   COMPARISON: Chest x-ray 11/01/2020   ACCESSION NUMBER(S): [id number]   ORDERING CLINICIAN: NASIM MCCORMACK   FINDINGS:     CARDIOMEDIASTINAL SILHOUETTE: Left heart border is obscured.   LUNGS: Interval worsening airspace opacity in the left mid and lower lung. New airspace opacity in the right lower lobe. Findings are concerning for worsening pneumonia. There is blunting of the left costophrenic angle which may relate to effusion and atelectasis. No right effusion or pneumothorax.   ABDOMEN: No remarkable upper abdominal findings.   BONES: No acute osseous abnormality.       Worsening bilateral airspace opacities as described above concerning for worsening multifocal pneumonia. Blunting of the left costophrenic angle suggestive of developing effusion and atelectasis. Continued radiographic follow-up to resolution is advised.   MACRO: None   Signed by: Ankita Chanel 11/10/2024 5:49 PM Dictation workstation:   [id number]      Assessment/Plan   Assessment & Plan  Pneumonia of both lungs due to infectious organism, unspecified part of lung    Acute hypoxic respiratory failure  Multifocal  pneumonia  COVID-pneumonia  Anemia    Admit to telemetry  Inpatient  Appreciate pulmonology recommendations  Continue Zithromax and Rocephin  Start remdesivir and Decadron  A.m. CBC and CMP  Continue oxygen  Mucinex  DuoNebs around-the-clock  As needed albuterol    DVT prophylaxis    SCDs  Lovenox        Holley Garcia, APRN-CNP

## 2024-11-11 NOTE — CONSULTS
PULMONOLOGY    Patient Name :Negar Arboleda  Date of service : 11/11/24  MRN: [id number]  YOB: 1990    Results from last 7 days   Lab Units 11/11/24  0728   SODIUM mmol/L 137   POTASSIUM mmol/L 4.5   CHLORIDE mmol/L 104   CO2 mmol/L 27   BUN mg/dL 7   CREATININE mg/dL 0.59   GLUCOSE mg/dL 129*   CALCIUM mg/dL 8.4*     Results from last 7 days   Lab Units 11/11/24  0728 11/10/24  1613   WBC AUTO x10*3/uL 7.2 8.9   HEMOGLOBIN g/dL 10.3* 10.4*   HEMATOCRIT % 29.3* 31.8*   PLATELETS AUTO x10*3/uL 422 458*             Assessment:  Patient is 34 y.o. female  with the following medical Problems:    Lobar pneumonia, left  COVID-19, multifocal  Pleural effusions.  Right small.  Left moderate.  Normocytic anemia    Recommendations:  Performed bedside ultrasound to evaluate pleural effusions, recommended left-sided thoracocentesis.  Discussed with patient and her  risks versus benefits of procedure she prefers to continue with conservative treatment.  Concerns for empyema.  New chest x-ray tomorrow morning.  Abx: Continue with azithromycin ceftriaxone.  Continue with remdesivir and dexamethasone.  Oxygen as needed to keep SO2 89-94%.  Currently on NC 3 L.  Continue with incentive spirometer.   Bronchodilators, DuoNebs, and Mucinex as needed.  PT/OT. Encourage ambulation. DVT prophylaxis-enoxaparin.  Minimize benzodiazepine and narcotics.  Head elevation and aspiration precautions.      History of Present Illness:   Negar Arboleda is a 34 y.o. femaleWho denies any medical history who tested positive for COVID on October 29 and presented to the ED on November 1 with COVID symptoms, tested positive, and was discharged.  She presents today with persistent fevers.  Patient reports that since October 29 she has been experiencing fevers, initially a dry cough that has now turned productive, shortness of breath, weakness, and decreased appetite.  She reports the fevers are as high as 103 and despite  treating them with over-the-counter medications she has not been able to go without a fever so she came back to the emergency room for evaluation.  She admits she has never had a COVID-vaccine.       Past Medical/Surgical History:   No past medical history on file.  No past surgical history on file.    Family History:   No relevant family history has been documented for this patient.    Allergies:     Allergies   Allergen Reactions    Penicillins Hives         Social history:       Current Medications:   No recently discontinued medications to reconcile    Review of Systems:   General: denies weight gain, denies loss of appetite, fever, chills, night sweats.  HEENT: denies headaches, dizziness, head trauma, visual changes, eye pain, tinnitus, nosebleeds, hoarseness or throat pain    Respiratory: denies chest pain, cough and hemoptysis. +dyspnea  Cardiovascular: denies orthopnea, paroxysmal nocturnal dyspnea, leg swelling, and previous heart attack.    Gastrointestinal: denies pain, nausea vomiting, diarrhea, constipation, melena or bleeding.  Genitourinary: denies hematuria, frequency, urgency or dysuria  Neurology: denies syncope, seizures, paralysis, paraesthesia   Endocrine: denies polyuria, polydipsia, skin or hair changes, and heat or cold intolerance  Musculoskeletal: denies joint pain, swelling, arthritis or myalgia  Hematologic: denies bleeding, adenopathy and easy bruising  Skin: denies rashes and skin discoloration  Psychiatry: +anxiety    Physical Exam:   Vital Signs:   Vitals:    11/11/24 0832   BP: 118/74   Pulse: 85   Resp:    Temp: 37.4 °C (99.3 °F)   SpO2: 91%       Input/Output:    Intake/Output Summary (Last 24 hours) at 11/11/2024 1020  Last data filed at 11/10/2024 1815  Gross per 24 hour   Intake 300 ml   Output --   Net 300 ml       Oxygen requirements:    Ventilator Information:       General appearance: Not in pain or distress, in no respiratory distress    HEENT: Atraumatic/normocephalic,  EOMI, SHINE, pharynx clear, moist mucosa, redness of the uvula appreciated,   Neck: Supple, no jugular venous distension, lymphadenopathy, thyromegaly or carotid bruits  Chest: decreased breath sounds, no wheezing, no crackles and no tenderness over ribs   Cardiovascular: Normal S1, S2, regular rate and rhythm, no murmur, rub or gallop  Abdomen: Normal sounds present, soft, lax with no tenderness, no hepatosplenomegaly, and no masses  Extremities: No edema. Pulses are equally present.   Skin: intact, no rashes   Neurologic: Alert and oriented x 3, No focal deficit     Investigations:  Labs, radiological imaging and cardiac work up were personally reviewed.    This document was created using dragon dictation and may contain unintended error.      Keke Harper MD      STAFF PHYSICIAN NOTE OF PERSONAL INVOLVEMENT IN CARE  As the attending physician, I certify that I personally reviewed the patient's history and personally examined the patient to confirm the physical findings described above, and that I reviewed the relevant imaging studies and available reports.  I also discussed the differential diagnosis and all of the proposed management plans with the patient and individuals accompanying the patient to this visit.  They had the opportunity to ask questions about the proposed management plans and to have those questions answered.

## 2024-11-11 NOTE — PROGRESS NOTES
Negar Arboleda is a 34 y.o. female on day 1 of admission presenting with Pneumonia of both lungs due to infectious organism, unspecified part of lung.      Subjective    34 y.o. femaleWho denies any medical history who tested positive for COVID on October 29 and presented to the ED on November 1 with COVID symptoms, tested positive, and was discharged.  She presents today with persistent fevers.  Patient reports that since October 29 she has been experiencing fevers, initially a dry cough that has now turned productive, shortness of breath, weakness, and decreased appetite.  She reports the fevers are as high as 103 and despite treating them with over-the-counter medications she has not been able to go without a fever so she came back to the emergency room for evaluation.  She admits she has never had a COVID-vaccine.     In the ED lab work, EKG, CTA chest, and chest x-ray were performed. Labs revealed white count up to 8.9 from 4.3 on November 1, H&H 10.4 and 31.8 (12.1 and 35.3 on 11/1/2024), platelets 458, negative for flu and COVID.  Pregnancy test was negative.  EKG, per ER physician impression revealed Rate of 99 bpm, regular rhythm, normal axis, normal intervals, no evidence of ST segment elevations or depressions, no pattern T wave abnormalities.  Chest x-ray revealed worsening bilateral airspace opacities concerning for worsening multifocal pneumonia, blunting of the left costophrenic angle suggestive of developing effusion and atelectasis continued radiographic follow-up to resolution is recommended.  CTA chest is pending.  Troponins were negative x 2.  She arrived tachycardic with a heart rate of 101, a low-grade fever of 37 2, and an SpO2 of 88%, blood pressure and respirations were within normal limits.  She was given Tylenol Zithromax and Rocephin and admitted for further evaluation and treatment under the care of Dr. Balbuena.          Objective     Last Recorded Vitals  /74   Pulse 85    Temp 37.4 °C (99.3 °F)   Resp 18   Wt 54.4 kg (120 lb)   SpO2 91%   Intake/Output last 3 Shifts:    Intake/Output Summary (Last 24 hours) at 11/11/2024 1312  Last data filed at 11/10/2024 1815  Gross per 24 hour   Intake 300 ml   Output --   Net 300 ml       Admission Weight  Weight: 54.4 kg (120 lb) (11/10/24 1457)    Daily Weight  11/10/24 : 54.4 kg (120 lb)    Image Results  CT angio chest for pulmonary embolism  Narrative: Interpreted By:  Mahesh Tinsley,   STUDY:  CT ANGIO CHEST FOR PULMONARY EMBOLISM;  11/10/2024 5:10 pm      INDICATION:  Signs/Symptoms:short of breath, hypoxic, recent covid.          COMPARISON:  None.      ACCESSION NUMBER(S):  [id number]      ORDERING CLINICIAN:  KUNAL BECERRA      TECHNIQUE:  Contiguous axial images of the chest were obtained after the  intravenous administration of iodinated contrast using angiographic  PE protocol. Coronal and sagittal reformatted images were  reconstructed from the axial data. MIP images were created on an  independent workstation and reviewed.      FINDINGS:      MEDIASTINUM AND LYMPH NODES:  The esophageal wall appears within  normal limits. Scattered prominent left hilar and left paratracheal  lymph nodes are noted, for example measuring up to 1.3 cm in the left  lower paratracheal location.  No pneumomediastinum.      VESSELS:  Normal caliber thoracic aorta without dissection. No  significant aortic atherosclerosis.  No acute pulmonary embolism.      HEART: Normal in size.  No coronary artery calcifications. No  significant pericardial effusion.      LUNG, AIRWAYS, AND PLEURA: There is dense consolidation in the  lingula with air bronchograms. There is diffuse ground-glass and  tree-in-bud opacity throughout the remainder of the left upper lobe.  There is also multifocal and diffuse areas of tree-in-bud opacity  throughout the bilateral lower lobes and posterior segment of the  right upper lobe. There is also mild tree-in-bud  ground-glass  opacities within the right middle lobe. There are bilateral pleural  effusions, small on the right and moderate in size on the left. No  pneumothorax. There is a 4.1 cm x 2.1 cm pulmonary cyst/bleb within  the medial aspect of the lingula. There is no fluid level to suggest  superimposed cyst infection.      OSSEOUS STRUCTURES: No acute osseous abnormality.      CHEST WALL SOFT TISSUES: No discernible abnormality.      UPPER ABDOMEN/OTHER: No acute abnormality.      Impression: Extensive pneumonia within all lobes of both lungs, most severe  within the lingula. Bilateral small parapneumonic pleural effusions  (left > right).      4.1 cm x 2.1 cm pulmonary cyst in the medial left lingula that could  predispose to spontaneous pneumothorax.      No acute pulmonary embolism.      MACRO:  None.      Signed by: Mahesh Tinsley 11/10/2024 7:02 PM  Dictation workstation:   [id number]  XR chest 1 view  Narrative: Interpreted By:  Ankita Chanel,   STUDY:  XR CHEST 1 VIEW;  11/10/2024 4:44 pm      INDICATION:  Signs/Symptoms:short of breath, hypoxic.      COMPARISON:  Chest x-ray 11/01/2020      ACCESSION NUMBER(S):  [id number]      ORDERING CLINICIAN:  NASIM MCCORMACK      FINDINGS:          CARDIOMEDIASTINAL SILHOUETTE:  Left heart border is obscured.      LUNGS:  Interval worsening airspace opacity in the left mid and lower lung.  New airspace opacity in the right lower lobe. Findings are concerning  for worsening pneumonia. There is blunting of the left costophrenic  angle which may relate to effusion and atelectasis. No right effusion  or pneumothorax.      ABDOMEN:  No remarkable upper abdominal findings.      BONES:  No acute osseous abnormality.      Impression: Worsening bilateral airspace opacities as described above concerning  for worsening multifocal pneumonia. Blunting of the left costophrenic  angle suggestive of developing effusion and atelectasis. Continued  radiographic follow-up to  resolution is advised.      MACRO:  None      Signed by: Ankita Chanel 11/10/2024 5:49 PM  Dictation workstation:   [id number]    Results from last 7 days   Lab Units 11/11/24  0728   WBC AUTO x10*3/uL 7.2   HEMOGLOBIN g/dL 10.3*   HEMATOCRIT % 29.3*   PLATELETS AUTO x10*3/uL 422      Results from last 7 days   Lab Units 11/11/24  0728   SODIUM mmol/L 137   POTASSIUM mmol/L 4.5   CHLORIDE mmol/L 104   CO2 mmol/L 27   BUN mg/dL 7   CREATININE mg/dL 0.59   GLUCOSE mg/dL 129*   CALCIUM mg/dL 8.4*      Review of systems cough.vs  Weakness fever that is been persistent no vomiting or diarrhea    Physical Exam  Awake alert oriented x 3 lungs are diminished but clear heart has regular rate and rhythm abdomen is soft nontender positive bowel sounds are present skin is warm and dry no clubbing or peripheral cyanosis nailbeds pink no palmar erythema palpable peripheral pulses are present  Relevant Results               Assessment/Plan                  Assessment & Plan  Pneumonia of both lungs due to infectious organism, unspecified part of lung      For now pneumonia in both lobes presentation she had COVID on October 29 she was negative now but this is development I think there after that process     Acute hypoxic respiratory failure  Multifocal pneumonia  COVID-pneumonia  Anemia     Admit to telemetry  Inpatient  Appreciate pulmonology recommendations  Continue Zithromax and Rocephin  Start remdesivir and Decadron  A.m. CBC and CMP  Continue oxygen  Mucinex  DuoNebs around-the-clock  As needed albuterol          Gene Balbuena DO

## 2024-11-12 ENCOUNTER — APPOINTMENT (OUTPATIENT)
Dept: RADIOLOGY | Facility: HOSPITAL | Age: 34
End: 2024-11-12
Payer: COMMERCIAL

## 2024-11-12 LAB
DSDNA AB SER-ACNC: 11 IU/ML
HOLD SPECIMEN: NORMAL
LDH SERPL L TO P-CCNC: 220 U/L (ref 84–246)
PROT SERPL-MCNC: 6.4 G/DL (ref 6.4–8.2)

## 2024-11-12 PROCEDURE — 71046 X-RAY EXAM CHEST 2 VIEWS: CPT

## 2024-11-12 PROCEDURE — 84155 ASSAY OF PROTEIN SERUM: CPT | Performed by: STUDENT IN AN ORGANIZED HEALTH CARE EDUCATION/TRAINING PROGRAM

## 2024-11-12 PROCEDURE — 1200000002 HC GENERAL ROOM WITH TELEMETRY DAILY

## 2024-11-12 PROCEDURE — 2500000001 HC RX 250 WO HCPCS SELF ADMINISTERED DRUGS (ALT 637 FOR MEDICARE OP): Performed by: NURSE PRACTITIONER

## 2024-11-12 PROCEDURE — 36415 COLL VENOUS BLD VENIPUNCTURE: CPT | Performed by: STUDENT IN AN ORGANIZED HEALTH CARE EDUCATION/TRAINING PROGRAM

## 2024-11-12 PROCEDURE — 2500000004 HC RX 250 GENERAL PHARMACY W/ HCPCS (ALT 636 FOR OP/ED): Performed by: NURSE PRACTITIONER

## 2024-11-12 PROCEDURE — 71046 X-RAY EXAM CHEST 2 VIEWS: CPT | Performed by: RADIOLOGY

## 2024-11-12 PROCEDURE — 36415 COLL VENOUS BLD VENIPUNCTURE: CPT | Performed by: INTERNAL MEDICINE

## 2024-11-12 PROCEDURE — 83615 LACTATE (LD) (LDH) ENZYME: CPT | Performed by: STUDENT IN AN ORGANIZED HEALTH CARE EDUCATION/TRAINING PROGRAM

## 2024-11-12 PROCEDURE — 2500000002 HC RX 250 W HCPCS SELF ADMINISTERED DRUGS (ALT 637 FOR MEDICARE OP, ALT 636 FOR OP/ED): Performed by: STUDENT IN AN ORGANIZED HEALTH CARE EDUCATION/TRAINING PROGRAM

## 2024-11-12 PROCEDURE — 94640 AIRWAY INHALATION TREATMENT: CPT

## 2024-11-12 RX ORDER — IPRATROPIUM BROMIDE AND ALBUTEROL SULFATE 2.5; .5 MG/3ML; MG/3ML
3 SOLUTION RESPIRATORY (INHALATION)
Status: DISCONTINUED | OUTPATIENT
Start: 2024-11-12 | End: 2024-11-15 | Stop reason: HOSPADM

## 2024-11-12 RX ADMIN — ENOXAPARIN SODIUM 40 MG: 40 INJECTION SUBCUTANEOUS at 08:47

## 2024-11-12 RX ADMIN — DEXAMETHASONE SODIUM PHOSPHATE 6 MG: 10 INJECTION INTRAMUSCULAR; INTRAVENOUS at 20:03

## 2024-11-12 RX ADMIN — GUAIFENESIN 600 MG: 600 TABLET, EXTENDED RELEASE ORAL at 20:03

## 2024-11-12 RX ADMIN — Medication 1 TABLET: at 20:03

## 2024-11-12 RX ADMIN — GUAIFENESIN 600 MG: 600 TABLET, EXTENDED RELEASE ORAL at 08:47

## 2024-11-12 RX ADMIN — Medication 1 TABLET: at 08:47

## 2024-11-12 RX ADMIN — CEFTRIAXONE SODIUM 2 G: 2 INJECTION, SOLUTION INTRAVENOUS at 17:51

## 2024-11-12 RX ADMIN — IPRATROPIUM BROMIDE AND ALBUTEROL SULFATE 3 ML: .5; 3 SOLUTION RESPIRATORY (INHALATION) at 16:20

## 2024-11-12 RX ADMIN — IPRATROPIUM BROMIDE AND ALBUTEROL SULFATE 3 ML: .5; 3 SOLUTION RESPIRATORY (INHALATION) at 19:20

## 2024-11-12 RX ADMIN — AZITHROMYCIN DIHYDRATE 500 MG: 500 INJECTION, POWDER, LYOPHILIZED, FOR SOLUTION INTRAVENOUS at 16:02

## 2024-11-12 ASSESSMENT — COGNITIVE AND FUNCTIONAL STATUS - GENERAL
MOBILITY SCORE: 24
DAILY ACTIVITIY SCORE: 24

## 2024-11-12 ASSESSMENT — PAIN SCALES - GENERAL: PAINLEVEL_OUTOF10: 0 - NO PAIN

## 2024-11-12 NOTE — PROGRESS NOTES
11/12/24 1706   Discharge Planning   Living Arrangements Spouse/significant other;Children   Support Systems Spouse/significant other;Children   Assistance Needed independent   Type of Residence Private residence   Expected Discharge Disposition Home   Does the patient need discharge transport arranged? No     Patient presented from home with her  and children.  Patient works, drives and is independent with all ADLs.  Care Coordination team following for assistance with discharge planning as needed.  Venessa DILLON TCC

## 2024-11-12 NOTE — PROGRESS NOTES
Negar Arboleda is a 34 y.o. female on day 2 of admission presenting with Pneumonia of both lungs due to infectious organism, unspecified part of lung.      Subjective   Seen today he is feeling a lot better.  Significant multilobar infiltrates in her lung she clinically looks much better than her x-ray does although she is still hypoxic no doubt this is going to go through to the end of the week I think treatment and we can have a clear up where her saturations are much improved but she does look good and she is hemodynamically stable and she is not in any respiratory distress at all       Objective     Last Recorded Vitals  /62 (BP Location: Left arm, Patient Position: Lying)   Pulse 97   Temp 36.3 °C (97.3 °F) (Temporal)   Resp 18   Wt 54.4 kg (120 lb)   SpO2 91%   Intake/Output last 3 Shifts:    Intake/Output Summary (Last 24 hours) at 11/12/2024 1808  Last data filed at 11/12/2024 1750  Gross per 24 hour   Intake 250 ml   Output --   Net 250 ml       Admission Weight  Weight: 54.4 kg (120 lb) (11/10/24 1457)    Daily Weight  11/10/24 : 54.4 kg (120 lb)    Image Results  XR chest 2 views  Narrative: Interpreted By:  Hamilton Whitfield,   STUDY:  XR CHEST 2 VIEWS;  11/12/2024 7:58 am      INDICATION:  Signs/Symptoms:evaluate pleural effusions.      COMPARISON:  11/10/2024      ACCESSION NUMBER(S):  [id number]      ORDERING CLINICIAN:  FELIBERTO TEE      FINDINGS:  CARDIOMEDIASTINAL SILHOUETTE AND VASCULATURE:      Cardiac size:  Within normal limits.  Aortic shadow:  Within normal limits.      Mediastinal contours: Within normal limits.      Pulmonary vasculature:  The central vasculature is unremarkable      LUNGS:  Patchy lingular infiltrate has improved. There is blunting of the  costophrenic angle posteriorly probably with a trace effusion.      ABDOMEN AND OTHER FINDINGS:  No remarkable upper abdominal findings.      BONES:  No acute osseous changes.      Impression: 1.  Resolving lingular  pneumonia.      Signed by: Hamilton Whitfield 11/12/2024 8:30 AM  Dictation workstation:   [id number]    Results from last 7 days   Lab Units 11/11/24  0728   WBC AUTO x10*3/uL 7.2   HEMOGLOBIN g/dL 10.3*   HEMATOCRIT % 29.3*   PLATELETS AUTO x10*3/uL 422      Results from last 7 days   Lab Units 11/11/24  0728   SODIUM mmol/L 137   POTASSIUM mmol/L 4.5   CHLORIDE mmol/L 104   CO2 mmol/L 27   BUN mg/dL 7   CREATININE mg/dL 0.59   GLUCOSE mg/dL 129*   CALCIUM mg/dL 8.4*      Review of systems no shortness of breath no cough no fever she is not tachycardic    Physical Exam  Lungs are clear posteriorly heart has a regular rate and rhythm abdomen is soft is not distended or firm no clubbing or peripheral cyanosis is present  Relevant Results               Assessment/Plan                  Assessment & Plan  Pneumonia of both lungs due to infectious organism, unspecified part of lung    Continue to treat the infiltrates IV antibiotic therapy glucocorticoids he is on remdesivir I suspect this is going to take until at least Friday if not Saturday or Sunday if you get her saturations up and improved.  [place] to ambulate she will have to have normal saturation values she does not wear oxygen at home she has no pulmonary disease whatsoever.      Continue with glucocorticoids IV antibiotics switch over to oral antibiotics he will need steroids when she goes home bronchodilators and of her other medications depending upon discharge              Gene Balbuena DO

## 2024-11-12 NOTE — PROGRESS NOTES
PULMONOLOGY    Patient Name :Negar Arboleda  Date of service : 11/12/24  MRN: [id number]  YOB: 1990    Results from last 7 days   Lab Units 11/11/24  0728   SODIUM mmol/L 137   POTASSIUM mmol/L 4.5   CHLORIDE mmol/L 104   CO2 mmol/L 27   BUN mg/dL 7   CREATININE mg/dL 0.59   GLUCOSE mg/dL 129*   CALCIUM mg/dL 8.4*     Results from last 7 days   Lab Units 11/11/24  0728 11/10/24  1613   WBC AUTO x10*3/uL 7.2 8.9   HEMOGLOBIN g/dL 10.3* 10.4*   HEMATOCRIT % 29.3* 31.8*   PLATELETS AUTO x10*3/uL 422 458*             Assessment:  Patient is 34 y.o. female  with the following medical Problems:    Lobar pneumonia, left  COVID-19, multifocal  Bilateral pleural effusions  Normocytic anemia    Recommendations:  11/11/24 - Performed bedside ultrasound to evaluate pleural effusions, recommended left-sided thoracocentesis.  Discussed with patient and her  risks versus benefits of procedure she prefers to continue with conservative treatment.  Concerns for empyema.    11/12/24 -New chest x-ray with significant improvement.  Performed bedside ultrasound to reevaluate left-sided pleural effusion, effusion decreased in size being small amount today on left sided recess. Discussed again about thoracocentesis with patient and her mother, they want to wait until tomorrow's CXR. Due to rapidly improvement of pleural effusion, concerns for inflammatory/autoimmune disease ordered ARAM anti-DNA antibody for Lupus.   Still needing some oxygen, now NC 4 L added DuoNebs 3 times daily.  Continue with Mucinex twice daily.  Abx: Continue with azithromycin ceftriaxone.  Continue with dexamethasone.  Remdesivir was discontinued.  Continue with incentive spirometer.   PT/OT. Encourage ambulation. DVT prophylaxis-enoxaparin.  Minimize benzodiazepine and narcotics.  Head elevation and aspiration precautions.      History of Present Illness:   Negar Arboleda is a 34 y.o. femaleWho denies any medical history who tested  positive for COVID on October 29 and presented to the ED on November 1 with COVID symptoms, tested positive, and was discharged.  She presents today with persistent fevers.  Patient reports that since October 29 she has been experiencing fevers, initially a dry cough that has now turned productive, shortness of breath, weakness, and decreased appetite.  She reports the fevers are as high as 103 and despite treating them with over-the-counter medications she has not been able to go without a fever so she came back to the emergency room for evaluation.  She admits she has never had a COVID-vaccine.       Past Medical/Surgical History:   No past medical history on file.  No past surgical history on file.    Family History:   No relevant family history has been documented for this patient.    Allergies:     Allergies   Allergen Reactions    Penicillins Hives         Social history:       Current Medications:   No recently discontinued medications to reconcile    Review of Systems:   General: denies weight gain, denies loss of appetite, fever, chills, night sweats.  HEENT: denies headaches, dizziness, head trauma, visual changes, eye pain, tinnitus, nosebleeds, hoarseness or throat pain    Respiratory: denies chest pain, cough and hemoptysis. +dyspnea  Cardiovascular: denies orthopnea, paroxysmal nocturnal dyspnea, leg swelling, and previous heart attack.    Gastrointestinal: denies pain, nausea vomiting, diarrhea, constipation, melena or bleeding.  Genitourinary: denies hematuria, frequency, urgency or dysuria  Neurology: denies syncope, seizures, paralysis, paraesthesia   Endocrine: denies polyuria, polydipsia, skin or hair changes, and heat or cold intolerance  Musculoskeletal: denies joint pain, swelling, arthritis or myalgia  Hematologic: denies bleeding, adenopathy and easy bruising  Skin: denies rashes and skin discoloration  Psychiatry: +anxiety    Physical Exam:   Vital Signs:   Vitals:    11/12/24 0700   BP:  111/71   Pulse: 76   Resp:    Temp: 36.2 °C (97.2 °F)   SpO2: (!) 87%       Input/Output:    Intake/Output Summary (Last 24 hours) at 11/12/2024 0920  Last data filed at 11/11/2024 1806  Gross per 24 hour   Intake 300 ml   Output --   Net 300 ml       Oxygen requirements:    Ventilator Information:       General appearance: Not in pain or distress, in no respiratory distress    HEENT: Atraumatic/normocephalic, EOMI, SHINE, pharynx clear, moist mucosa, redness of the uvula appreciated  Neck: Supple, no jugular venous distension, lymphadenopathy, thyromegaly or carotid bruits  Chest: Decreased breath sounds bi basal, no wheezing, no crackles and no tenderness over ribs   Cardiovascular: Normal S1, S2, regular rate and rhythm, no murmur, rub or gallop  Abdomen: Normal sounds present, soft, lax with no tenderness, no hepatosplenomegaly, and no masses  Extremities: No edema. Pulses are equally present.   Skin: intact, no rashes   Neurologic: Alert and oriented x 3, No focal deficit     Investigations:  Labs, radiological imaging and cardiac work up were personally reviewed.    This document was created using dragon dictation and may contain unintended error.      Keke Harper MD      STAFF PHYSICIAN NOTE OF PERSONAL INVOLVEMENT IN CARE  As the attending physician, I certify that I personally reviewed the patient's history and personally examined the patient to confirm the physical findings described above, and that I reviewed the relevant imaging studies and available reports.  I also discussed the differential diagnosis and all of the proposed management plans with the patient and individuals accompanying the patient to this visit.  They had the opportunity to ask questions about the proposed management plans and to have those questions answered.

## 2024-11-13 ENCOUNTER — APPOINTMENT (OUTPATIENT)
Dept: RADIOLOGY | Facility: HOSPITAL | Age: 34
End: 2024-11-13
Payer: COMMERCIAL

## 2024-11-13 LAB — ANA SER QL HEP2 SUBST: NEGATIVE

## 2024-11-13 PROCEDURE — 94640 AIRWAY INHALATION TREATMENT: CPT

## 2024-11-13 PROCEDURE — 2500000002 HC RX 250 W HCPCS SELF ADMINISTERED DRUGS (ALT 637 FOR MEDICARE OP, ALT 636 FOR OP/ED): Performed by: STUDENT IN AN ORGANIZED HEALTH CARE EDUCATION/TRAINING PROGRAM

## 2024-11-13 PROCEDURE — 1200000002 HC GENERAL ROOM WITH TELEMETRY DAILY

## 2024-11-13 PROCEDURE — 2500000004 HC RX 250 GENERAL PHARMACY W/ HCPCS (ALT 636 FOR OP/ED): Performed by: NURSE PRACTITIONER

## 2024-11-13 PROCEDURE — 2500000001 HC RX 250 WO HCPCS SELF ADMINISTERED DRUGS (ALT 637 FOR MEDICARE OP): Performed by: NURSE PRACTITIONER

## 2024-11-13 PROCEDURE — 2500000001 HC RX 250 WO HCPCS SELF ADMINISTERED DRUGS (ALT 637 FOR MEDICARE OP)

## 2024-11-13 PROCEDURE — 71046 X-RAY EXAM CHEST 2 VIEWS: CPT | Performed by: RADIOLOGY

## 2024-11-13 PROCEDURE — 71046 X-RAY EXAM CHEST 2 VIEWS: CPT

## 2024-11-13 RX ORDER — IPRATROPIUM BROMIDE AND ALBUTEROL SULFATE 2.5; .5 MG/3ML; MG/3ML
3 SOLUTION RESPIRATORY (INHALATION) EVERY 2 HOUR PRN
Status: DISCONTINUED | OUTPATIENT
Start: 2024-11-13 | End: 2024-11-15 | Stop reason: HOSPADM

## 2024-11-13 RX ADMIN — ENOXAPARIN SODIUM 40 MG: 40 INJECTION SUBCUTANEOUS at 08:55

## 2024-11-13 RX ADMIN — SALINE NASAL SPRAY 1 SPRAY: 1.5 SOLUTION NASAL at 10:45

## 2024-11-13 RX ADMIN — IPRATROPIUM BROMIDE AND ALBUTEROL SULFATE 3 ML: .5; 3 SOLUTION RESPIRATORY (INHALATION) at 06:49

## 2024-11-13 RX ADMIN — IPRATROPIUM BROMIDE AND ALBUTEROL SULFATE 3 ML: .5; 3 SOLUTION RESPIRATORY (INHALATION) at 14:35

## 2024-11-13 RX ADMIN — AZITHROMYCIN DIHYDRATE 500 MG: 500 INJECTION, POWDER, LYOPHILIZED, FOR SOLUTION INTRAVENOUS at 16:58

## 2024-11-13 RX ADMIN — GUAIFENESIN 600 MG: 600 TABLET, EXTENDED RELEASE ORAL at 20:05

## 2024-11-13 RX ADMIN — IPRATROPIUM BROMIDE AND ALBUTEROL SULFATE 3 ML: .5; 3 SOLUTION RESPIRATORY (INHALATION) at 18:53

## 2024-11-13 RX ADMIN — CEFTRIAXONE SODIUM 2 G: 2 INJECTION, SOLUTION INTRAVENOUS at 16:12

## 2024-11-13 RX ADMIN — Medication 1 TABLET: at 08:55

## 2024-11-13 RX ADMIN — DEXAMETHASONE SODIUM PHOSPHATE 6 MG: 10 INJECTION INTRAMUSCULAR; INTRAVENOUS at 20:03

## 2024-11-13 RX ADMIN — GUAIFENESIN 600 MG: 600 TABLET, EXTENDED RELEASE ORAL at 08:55

## 2024-11-13 RX ADMIN — Medication 1 TABLET: at 20:05

## 2024-11-13 ASSESSMENT — COGNITIVE AND FUNCTIONAL STATUS - GENERAL
DAILY ACTIVITIY SCORE: 24
MOBILITY SCORE: 24
DAILY ACTIVITIY SCORE: 24
MOBILITY SCORE: 24

## 2024-11-13 ASSESSMENT — PAIN SCALES - GENERAL
PAINLEVEL_OUTOF10: 0 - NO PAIN

## 2024-11-13 NOTE — PROGRESS NOTES
Negar Arboleda is a 34 y.o. female on day 3 of admission presenting with Pneumonia of both lungs due to infectious organism, unspecified part of lung.      Subjective   Seen today seems to be better no shortness of breath no fever trying to now get her off of supplemental oxygen I talked with her mother at bedside at length and want to get her off oxygen or need to get her off prior to her going home       Objective     Last Recorded Vitals  /74   Pulse 89   Temp 36.6 °C (97.9 °F)   Resp 18   Wt 54.4 kg (120 lb)   SpO2 95%   Intake/Output last 3 Shifts:    Intake/Output Summary (Last 24 hours) at 11/13/2024 1207  Last data filed at 11/12/2024 1817  Gross per 24 hour   Intake 300 ml   Output --   Net 300 ml       Admission Weight  Weight: 54.4 kg (120 lb) (11/10/24 1457)    Daily Weight  11/10/24 : 54.4 kg (120 lb)    Image Results  XR chest 2 views  Narrative: Interpreted By:  Hamilton Whitfield,   STUDY:  XR CHEST 2 VIEWS;  11/13/2024 8:07 am      INDICATION:  Signs/Symptoms:Pneumonia with BL pleural effusions.  Concerns for  left sided empyema..      COMPARISON:  11/12/2024      ACCESSION NUMBER(S):  [id number]      ORDERING CLINICIAN:  FELIBERTO TEE      FINDINGS:  CARDIOMEDIASTINAL SILHOUETTE AND VASCULATURE:      Cardiac size:  Within normal limits.  Aortic shadow:  Within normal limits.      Mediastinal contours: Within normal limits.      Pulmonary vasculature:  The central vasculature is unremarkable      LUNGS:  There is persistent patchy infiltrate at lingula and probably  inferior aspect of the left upper lobe. This appears less  consolidated on the lateral view. There is also slight patchy density  at the lung base posteriorly, probable at the left posterior basal  segment with additional pneumonic infiltrate possibly slightly  increased and with a small effusion.      ABDOMEN AND OTHER FINDINGS:  No remarkable upper abdominal findings.      BONES:  No acute osseous changes.       Impression: 1.  As above.      Signed by: Hamilton Whitfield 11/13/2024 8:37 AM  Dictation workstation:   [id number]    Results from last 7 days   Lab Units 11/11/24  0728   WBC AUTO x10*3/uL 7.2   HEMOGLOBIN g/dL 10.3*   HEMATOCRIT % 29.3*   PLATELETS AUTO x10*3/uL 422      Results from last 7 days   Lab Units 11/11/24  0728   SODIUM mmol/L 137   POTASSIUM mmol/L 4.5   CHLORIDE mmol/L 104   CO2 mmol/L 27   BUN mg/dL 7   CREATININE mg/dL 0.59   GLUCOSE mg/dL 129*   CALCIUM mg/dL 8.4*      In the review of systems no shortness of breath no fever no vomiting or diarrhea    Physical Exam  Lungs sound clear to be down in the bases there is a slight wheeze on the right base right side little bit diminished otherwise good air entry moving air well heart has a regular rate and rhythm  Relevant Results               Assessment/Plan                  Assessment & Plan  Pneumonia of both lungs due to infectious organism, unspecified part of lung    I am going to review her chest x-ray was just done again this morning like to review that I do not believe that she is getting either thoracentesis or anything want to see if we can get rid of the infiltrates and the effusions and I do want to send her home without oxygen      I believe much of this is from the COVID-19 infection however we also sent away for lupus antibodies I will review all of that and see if the send outs are complete and do that tomorrow              Gene Balbuena, DO

## 2024-11-13 NOTE — PROGRESS NOTES
PULMONOLOGY    Patient Name :Negar Arboleda  Date of service : 11/13/24  MRN: [id number]  YOB: 1990    Results from last 7 days   Lab Units 11/11/24  0728   SODIUM mmol/L 137   POTASSIUM mmol/L 4.5   CHLORIDE mmol/L 104   CO2 mmol/L 27   BUN mg/dL 7   CREATININE mg/dL 0.59   GLUCOSE mg/dL 129*   CALCIUM mg/dL 8.4*     Results from last 7 days   Lab Units 11/11/24  0728 11/10/24  1613   WBC AUTO x10*3/uL 7.2 8.9   HEMOGLOBIN g/dL 10.3* 10.4*   HEMATOCRIT % 29.3* 31.8*   PLATELETS AUTO x10*3/uL 422 458*             Assessment:  Patient is 34 y.o. female  with the following medical Problems:    Lobar pneumonia, left vs loffler's syndrome in setting of possible lupus  COVID-19, multifocal  Bilateral pleural effusions  Normocytic anemia    Recommendations:  11/11/24 - Performed bedside ultrasound to evaluate pleural effusions, recommended left-sided thoracocentesis.  Discussed with patient and her  risks versus benefits of procedure she prefers to continue with conservative treatment.  Concerns for empyema.    11/12/24 - New chest x-ray with significant improvement.  Performed bedside ultrasound to reevaluate left-sided pleural effusion, effusion decreased in size being small amount today on left sided recess. Discussed again about thoracocentesis with patient and her mother, they want to wait until tomorrow's CXR. Due to rapidly improvement of pleural effusion, concerns for inflammatory/autoimmune disease ordered ARAM anti-DNA antibody for Lupus.  11/13/24 - anti-dsDNA lab positive. 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   Patient will likely need long course of steroids and possibly O2 at home.  Recommend patient follow-up with Rheumatologist outpatient.  Still needing some oxygen, now NC 3 L continue DuoNebs 3 times daily.  Continue with Mucinex twice daily.  Abx: Continue with azithromycin ceftriaxone.  Continue with dexamethasone.   Continue with incentive spirometer.    PT/OT. Encourage ambulation. DVT prophylaxis-enoxaparin.  Minimize benzodiazepine and narcotics.  Head elevation and aspiration precautions.      History of Present Illness:   Negar Arboleda is a 34 y.o. femaleWho denies any medical history who tested positive for COVID on October 29 and presented to the ED on November 1 with COVID symptoms, tested positive, and was discharged.  She presents today with persistent fevers.  Patient reports that since October 29 she has been experiencing fevers, initially a dry cough that has now turned productive, shortness of breath, weakness, and decreased appetite.  She reports the fevers are as high as 103 and despite treating them with over-the-counter medications she has not been able to go without a fever so she came back to the emergency room for evaluation.  She admits she has never had a COVID-vaccine.       Past Medical/Surgical History:   No past medical history on file.  No past surgical history on file.    Family History:   No relevant family history has been documented for this patient.    Allergies:     Allergies   Allergen Reactions    Penicillins Hives         Social history:   reports that she has quit smoking. Her smoking use included cigarettes. She started smoking about 10 months ago. She has a 0.2 pack-year smoking history. She has never used smokeless tobacco.    Current Medications:   No recently discontinued medications to reconcile    Review of Systems:   General: denies weight gain, denies loss of appetite, fever, chills, night sweats.  HEENT: denies headaches, dizziness, head trauma, visual changes, eye pain, tinnitus, nosebleeds, hoarseness or throat pain    Respiratory: denies chest pain, cough and hemoptysis. +dyspnea  Cardiovascular: denies orthopnea, paroxysmal nocturnal dyspnea, leg swelling, and previous heart attack.    Gastrointestinal: denies pain, nausea vomiting, diarrhea, constipation, melena or bleeding.  Genitourinary: denies  hematuria, frequency, urgency or dysuria  Neurology: denies syncope, seizures, paralysis, paraesthesia   Endocrine: denies polyuria, polydipsia, skin or hair changes, and heat or cold intolerance  Musculoskeletal: denies joint pain, swelling, arthritis or myalgia  Hematologic: denies bleeding, adenopathy and easy bruising  Skin: denies rashes and skin discoloration  Psychiatry: +anxiety    Physical Exam:   Vital Signs:   Vitals:    11/13/24 1528   BP: 108/62   Pulse: 79   Resp:    Temp: 36 °C (96.8 °F)   SpO2: 93%       Input/Output:    Intake/Output Summary (Last 24 hours) at 11/13/2024 1601  Last data filed at 11/12/2024 1817  Gross per 24 hour   Intake 300 ml   Output --   Net 300 ml       Oxygen requirements:    Ventilator Information:  FiO2 (%):  [24 %-32 %] 24 %    General appearance: Not in pain or distress, in no respiratory distress    HEENT: Atraumatic/normocephalic, EOMI, SHINE, pharynx clear, moist mucosa, redness of the uvula appreciated  Neck: Supple, no jugular venous distension, lymphadenopathy, thyromegaly or carotid bruits  Chest: Decreased breath sounds bi basal, no wheezing, no crackles and no tenderness over ribs   Cardiovascular: Normal S1, S2, regular rate and rhythm, no murmur, rub or gallop  Abdomen: Normal sounds present, soft, lax with no tenderness, no hepatosplenomegaly, and no masses  Extremities: No edema. Pulses are equally present.   Skin: intact, no rashes   Neurologic: Alert and oriented x 3, No focal deficit     Investigations:  Labs, radiological imaging and cardiac work up were personally reviewed.    This document was created using dragon dictation and may contain unintended error.      Clinton Chowdary MD      STAFF PHYSICIAN NOTE OF PERSONAL INVOLVEMENT IN CARE  As the attending physician, I certify that I personally reviewed the patient's history and personally examined the patient to confirm the physical findings described above, and that I reviewed the relevant imaging studies  and available reports.  I also discussed the differential diagnosis and all of the proposed management plans with the patient and individuals accompanying the patient to this visit.  They had the opportunity to ask questions about the proposed management plans and to have those questions answered.

## 2024-11-14 PROCEDURE — 2500000002 HC RX 250 W HCPCS SELF ADMINISTERED DRUGS (ALT 637 FOR MEDICARE OP, ALT 636 FOR OP/ED): Performed by: INTERNAL MEDICINE

## 2024-11-14 PROCEDURE — 94640 AIRWAY INHALATION TREATMENT: CPT

## 2024-11-14 PROCEDURE — 2500000005 HC RX 250 GENERAL PHARMACY W/O HCPCS: Performed by: NURSE PRACTITIONER

## 2024-11-14 PROCEDURE — 2500000002 HC RX 250 W HCPCS SELF ADMINISTERED DRUGS (ALT 637 FOR MEDICARE OP, ALT 636 FOR OP/ED): Performed by: STUDENT IN AN ORGANIZED HEALTH CARE EDUCATION/TRAINING PROGRAM

## 2024-11-14 PROCEDURE — 2500000004 HC RX 250 GENERAL PHARMACY W/ HCPCS (ALT 636 FOR OP/ED): Performed by: NURSE PRACTITIONER

## 2024-11-14 PROCEDURE — 1200000002 HC GENERAL ROOM WITH TELEMETRY DAILY

## 2024-11-14 PROCEDURE — 2500000001 HC RX 250 WO HCPCS SELF ADMINISTERED DRUGS (ALT 637 FOR MEDICARE OP): Performed by: NURSE PRACTITIONER

## 2024-11-14 RX ORDER — AZITHROMYCIN 250 MG/1
500 TABLET, FILM COATED ORAL
Status: DISCONTINUED | OUTPATIENT
Start: 2024-11-14 | End: 2024-11-15 | Stop reason: HOSPADM

## 2024-11-14 RX ADMIN — DEXAMETHASONE SODIUM PHOSPHATE 6 MG: 10 INJECTION INTRAMUSCULAR; INTRAVENOUS at 20:12

## 2024-11-14 RX ADMIN — GUAIFENESIN 600 MG: 600 TABLET, EXTENDED RELEASE ORAL at 09:36

## 2024-11-14 RX ADMIN — Medication 1 TABLET: at 20:12

## 2024-11-14 RX ADMIN — CEFTRIAXONE SODIUM 2 G: 2 INJECTION, SOLUTION INTRAVENOUS at 16:49

## 2024-11-14 RX ADMIN — Medication 1 L/MIN: at 08:09

## 2024-11-14 RX ADMIN — IPRATROPIUM BROMIDE AND ALBUTEROL SULFATE 3 ML: .5; 3 SOLUTION RESPIRATORY (INHALATION) at 08:06

## 2024-11-14 RX ADMIN — IPRATROPIUM BROMIDE AND ALBUTEROL SULFATE 3 ML: .5; 3 SOLUTION RESPIRATORY (INHALATION) at 19:21

## 2024-11-14 RX ADMIN — Medication 1 TABLET: at 09:36

## 2024-11-14 RX ADMIN — GUAIFENESIN 600 MG: 600 TABLET, EXTENDED RELEASE ORAL at 20:12

## 2024-11-14 RX ADMIN — AZITHROMYCIN DIHYDRATE 500 MG: 250 TABLET ORAL at 16:45

## 2024-11-14 RX ADMIN — IPRATROPIUM BROMIDE AND ALBUTEROL SULFATE 3 ML: .5; 3 SOLUTION RESPIRATORY (INHALATION) at 13:06

## 2024-11-14 ASSESSMENT — COGNITIVE AND FUNCTIONAL STATUS - GENERAL
DAILY ACTIVITIY SCORE: 24
MOBILITY SCORE: 24

## 2024-11-14 ASSESSMENT — PAIN SCALES - GENERAL
PAINLEVEL_OUTOF10: 0 - NO PAIN
PAINLEVEL_OUTOF10: 0 - NO PAIN

## 2024-11-14 ASSESSMENT — PAIN - FUNCTIONAL ASSESSMENT: PAIN_FUNCTIONAL_ASSESSMENT: 0-10

## 2024-11-14 NOTE — PROGRESS NOTES
PULMONOLOGY    Patient Name :Negar Arboleda  Date of service : 11/14/24  MRN: [id number]  YOB: 1990    Results from last 7 days   Lab Units 11/11/24  0728   SODIUM mmol/L 137   POTASSIUM mmol/L 4.5   CHLORIDE mmol/L 104   CO2 mmol/L 27   BUN mg/dL 7   CREATININE mg/dL 0.59   GLUCOSE mg/dL 129*   CALCIUM mg/dL 8.4*     Results from last 7 days   Lab Units 11/11/24  0728 11/10/24  1613   WBC AUTO x10*3/uL 7.2 8.9   HEMOGLOBIN g/dL 10.3* 10.4*   HEMATOCRIT % 29.3* 31.8*   PLATELETS AUTO x10*3/uL 422 458*             Assessment:  Patient is 34 y.o. female  with the following medical Problems:    Lobar pneumonia, left vs loffler's syndrome in setting of possible lupus  COVID-19, multifocal  Bilateral pleural effusions  Normocytic anemia    Recommendations:  11/11/24 - Performed bedside ultrasound to evaluate pleural effusions, recommended left-sided thoracocentesis.  Discussed with patient and her  risks versus benefits of procedure she prefers to continue with conservative treatment.  Concerns for empyema.    11/12/24 - New chest x-ray with significant improvement.  Performed bedside ultrasound to reevaluate left-sided pleural effusion, effusion decreased in size being small amount today on left sided recess. Discussed again about thoracocentesis with patient and her mother, they want to wait until tomorrow's CXR. Due to rapidly improvement of pleural effusion, concerns for inflammatory/autoimmune disease ordered ARAM anti-DNA antibody for Lupus.  11/13/24 - Reviewed CXR. Anti-dsDNA lab positive. Suggestive of lupus with associated loffler's syndrome. Dr. Kolb contacted Dr. Hernández rheumatologist. Patient needs outpatient follow up for further work up.   11/14/24 -Seen at bedside, feeling better.  OFF of oxygen. Able to walk around her floor.  Patient is a stable to be discharged from pulmonary standpoint and continue outpatient follow-up with rheumatology.  Recommended total of 7 days of  antibiotics and total of 10 days of dexamethasone.  While inpatient continue DuoNebs 3 times daily.  Continue with Mucinex twice daily.  PT/OT. Encourage ambulation. DVT prophylaxis-enoxaparin.  Incentive spirometer.  Minimize benzodiazepine and narcotics. Head elevation and aspiration precautions.      History of Present Illness:   Negar Arboleda is a 34 y.o. femaleWho denies any medical history who tested positive for COVID on October 29 and presented to the ED on November 1 with COVID symptoms, tested positive, and was discharged.  She presents today with persistent fevers.  Patient reports that since October 29 she has been experiencing fevers, initially a dry cough that has now turned productive, shortness of breath, weakness, and decreased appetite.  She reports the fevers are as high as 103 and despite treating them with over-the-counter medications she has not been able to go without a fever so she came back to the emergency room for evaluation.  She admits she has never had a COVID-vaccine.       Past Medical/Surgical History:   No past medical history on file.  No past surgical history on file.    Family History:   No relevant family history has been documented for this patient.    Allergies:     Allergies   Allergen Reactions    Penicillins Hives     Social history:   reports that she has quit smoking. Her smoking use included cigarettes. She started smoking about 10 months ago. She has a 0.2 pack-year smoking history. She has never used smokeless tobacco.    Current Medications:   No recently discontinued medications to reconcile    Review of Systems:   General: denies weight gain, denies loss of appetite, fever, chills, night sweats.  HEENT: denies headaches, dizziness, head trauma, visual changes, eye pain, tinnitus, nosebleeds, hoarseness or throat pain    Respiratory: denies chest pain, dyspnea, cough and hemoptysis.   Cardiovascular: denies orthopnea, paroxysmal nocturnal dyspnea, leg  swelling, and previous heart attack.    Gastrointestinal: denies pain, nausea vomiting, diarrhea, constipation, melena or bleeding.  Genitourinary: denies hematuria, frequency, urgency or dysuria  Neurology: denies syncope, seizures, paralysis, paraesthesia   Endocrine: denies polyuria, polydipsia, skin or hair changes, and heat or cold intolerance  Musculoskeletal: denies joint pain, swelling, arthritis or myalgia  Hematologic: denies bleeding, adenopathy and easy bruising  Skin: denies rashes and skin discoloration  Psychiatry: +anxiety    Physical Exam:   Vital Signs:   Vitals:    11/14/24 0809   BP:    Pulse:    Resp:    Temp:    SpO2: 92%       Input/Output:    Intake/Output Summary (Last 24 hours) at 11/14/2024 0832  Last data filed at 11/13/2024 1901  Gross per 24 hour   Intake 300 ml   Output --   Net 300 ml       Oxygen requirements:    Ventilator Information:  FiO2 (%):  [24 %] 24 %    General appearance: Not in pain or distress, in no respiratory distress    HEENT: Atraumatic/normocephalic, EOMI, SHINE, pharynx clear, moist mucosa, redness of the uvula appreciated  Neck: Supple, no jugular venous distension, lymphadenopathy, thyromegaly or carotid bruits  Chest: normal breath sounds, no wheezing, no crackles and no tenderness over ribs   Cardiovascular: Normal S1, S2, regular rate and rhythm, no murmur, rub or gallop  Abdomen: Normal sounds present, soft, lax with no tenderness, no hepatosplenomegaly, and no masses  Extremities: No edema. Pulses are equally present.   Skin: intact, no rashes   Neurologic: Alert and oriented x 3, No focal deficit     Investigations:  Labs, radiological imaging and cardiac work up were personally reviewed.    This document was created using dragon dictation and may contain unintended error.      Keke Harper MD

## 2024-11-14 NOTE — PROGRESS NOTES
IV to PO Conversion    Negar Arboleda is a 34 y.o. female who qualifies for IV to PO therapy conversion.    Inclusion and exclusion criteria have been evaluated. Will change existing order for azithromycin 500 mg IV every 24 hours  to azithromycin 500 mg PO every 24 hours  per protocol/policy.      Please call with any questions.  Tania Lunsford, PharmD, [facility]  [phone]

## 2024-11-14 NOTE — CARE PLAN
The patient's goals for the shift include  to be able to walk around the unit    The clinical goals for the shift include maintain patent airway and for pt to have SpO2 92% or >, potentially wean pt off of O2 completely       Problem: Safety - Adult  Goal: Free from fall injury  Outcome: Progressing       Problem: Chronic Conditions and Co-morbidities  Goal: Patient's chronic conditions and co-morbidity symptoms are monitored and maintained or improved  Outcome: Progressing

## 2024-11-14 NOTE — PROGRESS NOTES
Negar Arboleda is a 34 y.o. female on day 4 of admission presenting with Pneumonia of both lungs due to infectious organism, unspecified part of lung.      Subjective   Seen today getting better she is off of oxygen breathing is easier feels good chest x-ray showing resolution of the pneumonia       Objective     Last Recorded Vitals  /67 (BP Location: Left arm, Patient Position: Sitting)   Pulse 79   Temp 36.7 °C (98.1 °F) (Temporal)   Resp 18   Wt 54.4 kg (120 lb)   SpO2 93%   Intake/Output last 3 Shifts:    Intake/Output Summary (Last 24 hours) at 11/14/2024 1419  Last data filed at 11/13/2024 1901  Gross per 24 hour   Intake 300 ml   Output --   Net 300 ml       Admission Weight  Weight: 54.4 kg (120 lb) (11/10/24 1457)    Daily Weight  11/10/24 : 54.4 kg (120 lb)    Image Results  XR chest 2 views  Narrative: Interpreted By:  Hamilton Whitfield,   STUDY:  XR CHEST 2 VIEWS;  11/13/2024 8:07 am      INDICATION:  Signs/Symptoms:Pneumonia with BL pleural effusions.  Concerns for  left sided empyema..      COMPARISON:  11/12/2024      ACCESSION NUMBER(S):  [id number]      ORDERING CLINICIAN:  FELIBERTO TEE      FINDINGS:  CARDIOMEDIASTINAL SILHOUETTE AND VASCULATURE:      Cardiac size:  Within normal limits.  Aortic shadow:  Within normal limits.      Mediastinal contours: Within normal limits.      Pulmonary vasculature:  The central vasculature is unremarkable      LUNGS:  There is persistent patchy infiltrate at lingula and probably  inferior aspect of the left upper lobe. This appears less  consolidated on the lateral view. There is also slight patchy density  at the lung base posteriorly, probable at the left posterior basal  segment with additional pneumonic infiltrate possibly slightly  increased and with a small effusion.      ABDOMEN AND OTHER FINDINGS:  No remarkable upper abdominal findings.      BONES:  No acute osseous changes.      Impression: 1.  As above.      Signed by: Hamilton Whitfield  11/13/2024 8:37 AM  Dictation workstation:   [id number]    Results from last 7 days   Lab Units 11/11/24  0728   WBC AUTO x10*3/uL 7.2   HEMOGLOBIN g/dL 10.3*   HEMATOCRIT % 29.3*   PLATELETS AUTO x10*3/uL 422      Results from last 7 days   Lab Units 11/11/24  0728   SODIUM mmol/L 137   POTASSIUM mmol/L 4.5   CHLORIDE mmol/L 104   CO2 mmol/L 27   BUN mg/dL 7   CREATININE mg/dL 0.59   GLUCOSE mg/dL 129*   CALCIUM mg/dL 8.4*      Review of systems no shortness of breath no fever still some cough weakness    Physical Exam  Lungs are diminished but clear heart has a regular rate and rhythm abdomen is soft is not distended or firm  Relevant Results               Assessment/Plan                  Assessment & Plan  Pneumonia of both lungs due to infectious organism, unspecified part of lung    For now continue with the same treatment chest x-ray showing resolution of the infiltrates continue with IV antibiotics change her over to orals and probably oral steroids when she goes home and I am thinking tomorrow and may be able to discharge her home if not tomorrow I think certainly Saturday              Gene Balbuena, DO

## 2024-11-15 ENCOUNTER — PHARMACY VISIT (OUTPATIENT)
Dept: PHARMACY | Facility: CLINIC | Age: 34
End: 2024-11-15
Payer: MEDICAID

## 2024-11-15 VITALS
TEMPERATURE: 97.7 F | SYSTOLIC BLOOD PRESSURE: 110 MMHG | DIASTOLIC BLOOD PRESSURE: 58 MMHG | RESPIRATION RATE: 18 BRPM | HEIGHT: 60 IN | WEIGHT: 120 LBS | BODY MASS INDEX: 23.56 KG/M2 | OXYGEN SATURATION: 93 % | HEART RATE: 88 BPM

## 2024-11-15 PROBLEM — J18.9 PNEUMONIA OF BOTH LUNGS DUE TO INFECTIOUS ORGANISM, UNSPECIFIED PART OF LUNG: Status: RESOLVED | Noted: 2024-11-10 | Resolved: 2024-11-15

## 2024-11-15 PROCEDURE — 2500000004 HC RX 250 GENERAL PHARMACY W/ HCPCS (ALT 636 FOR OP/ED): Performed by: NURSE PRACTITIONER

## 2024-11-15 PROCEDURE — RXMED WILLOW AMBULATORY MEDICATION CHARGE

## 2024-11-15 PROCEDURE — 2500000001 HC RX 250 WO HCPCS SELF ADMINISTERED DRUGS (ALT 637 FOR MEDICARE OP): Performed by: NURSE PRACTITIONER

## 2024-11-15 PROCEDURE — 2500000002 HC RX 250 W HCPCS SELF ADMINISTERED DRUGS (ALT 637 FOR MEDICARE OP, ALT 636 FOR OP/ED): Performed by: STUDENT IN AN ORGANIZED HEALTH CARE EDUCATION/TRAINING PROGRAM

## 2024-11-15 PROCEDURE — 2500000002 HC RX 250 W HCPCS SELF ADMINISTERED DRUGS (ALT 637 FOR MEDICARE OP, ALT 636 FOR OP/ED): Performed by: INTERNAL MEDICINE

## 2024-11-15 PROCEDURE — 94640 AIRWAY INHALATION TREATMENT: CPT

## 2024-11-15 RX ORDER — DEXAMETHASONE 6 MG/1
6 TABLET ORAL
Qty: 20 TABLET | Refills: 0 | Status: SHIPPED | OUTPATIENT
Start: 2024-11-15 | End: 2024-11-25

## 2024-11-15 RX ORDER — BENZONATATE 100 MG/1
100 CAPSULE ORAL EVERY 8 HOURS PRN
Qty: 20 CAPSULE | Refills: 0 | Status: SHIPPED | OUTPATIENT
Start: 2024-11-15

## 2024-11-15 RX ORDER — AZITHROMYCIN 500 MG/1
500 TABLET, FILM COATED ORAL DAILY
Qty: 5 TABLET | Refills: 0 | Status: SHIPPED | OUTPATIENT
Start: 2024-11-15 | End: 2024-11-20

## 2024-11-15 RX ORDER — CEFDINIR 300 MG/1
300 CAPSULE ORAL 2 TIMES DAILY
Qty: 20 CAPSULE | Refills: 0 | Status: SHIPPED | OUTPATIENT
Start: 2024-11-15 | End: 2024-11-25

## 2024-11-15 RX ORDER — PREDNISONE 20 MG/1
40 TABLET ORAL DAILY
Qty: 10 TABLET | Refills: 0 | Status: SHIPPED | OUTPATIENT
Start: 2024-11-15 | End: 2024-11-15 | Stop reason: HOSPADM

## 2024-11-15 RX ORDER — GUAIFENESIN 600 MG/1
600 TABLET, EXTENDED RELEASE ORAL 2 TIMES DAILY
Qty: 10 TABLET | Refills: 0 | Status: SHIPPED | OUTPATIENT
Start: 2024-11-15 | End: 2024-11-20

## 2024-11-15 RX ORDER — L. ACIDOPHILUS/L.BULGARICUS 1MM CELL
1 TABLET ORAL 2 TIMES DAILY
Qty: 60 TABLET | Refills: 0 | Status: SHIPPED | OUTPATIENT
Start: 2024-11-15 | End: 2024-12-15

## 2024-11-15 RX ADMIN — Medication 1 TABLET: at 08:43

## 2024-11-15 RX ADMIN — GUAIFENESIN 600 MG: 600 TABLET, EXTENDED RELEASE ORAL at 08:43

## 2024-11-15 RX ADMIN — IPRATROPIUM BROMIDE AND ALBUTEROL SULFATE 3 ML: .5; 3 SOLUTION RESPIRATORY (INHALATION) at 07:42

## 2024-11-15 RX ADMIN — IPRATROPIUM BROMIDE AND ALBUTEROL SULFATE 3 ML: .5; 3 SOLUTION RESPIRATORY (INHALATION) at 12:03

## 2024-11-15 RX ADMIN — AZITHROMYCIN DIHYDRATE 500 MG: 250 TABLET ORAL at 08:43

## 2024-11-15 ASSESSMENT — COGNITIVE AND FUNCTIONAL STATUS - GENERAL
DAILY ACTIVITIY SCORE: 24
MOBILITY SCORE: 24

## 2024-11-15 NOTE — PROGRESS NOTES
PULMONOLOGY    Patient Name :Negar Arboleda  Date of service : 11/15/24  MRN: [id number]  YOB: 1990    Results from last 7 days   Lab Units 11/11/24  0728   SODIUM mmol/L 137   POTASSIUM mmol/L 4.5   CHLORIDE mmol/L 104   CO2 mmol/L 27   BUN mg/dL 7   CREATININE mg/dL 0.59   GLUCOSE mg/dL 129*   CALCIUM mg/dL 8.4*     Results from last 7 days   Lab Units 11/11/24  0728 11/10/24  1613   WBC AUTO x10*3/uL 7.2 8.9   HEMOGLOBIN g/dL 10.3* 10.4*   HEMATOCRIT % 29.3* 31.8*   PLATELETS AUTO x10*3/uL 422 458*             Assessment:  Patient is 34 y.o. female  with the following medical Problems:    Lobar pneumonia, left vs loffler's syndrome in setting of possible lupus  COVID-19, multifocal  Bilateral pleural effusions  Normocytic anemia    Recommendations:  Seen at bedside.  No overnight events.  Feeling fine.  Off of oxygen. Patient being treated for covid and lobar pneumonia. Anti-dsDNA was ordered after rapidly improvement of pleural effusions and came positive, possible lupus with associated low flow syndrome.  Patient needs outpatient follow-up with rheumatology and is stable to be discharged from pulmonary standpoint. Recommended total of 7 days of antibiotics and total of 10 days of dexamethasone.   While inpatient continue DuoNebs 3 times daily.  Continue with Mucinex twice daily.  PT/OT. Encourage ambulation. DVT prophylaxis-enoxaparin.  Incentive spirometer.  Minimize benzodiazepine and narcotics. Head elevation and aspiration precautions.      History of Present Illness:   Negar Arboleda is a 34 y.o. femaleWho denies any medical history who tested positive for COVID on October 29 and presented to the ED on November 1 with COVID symptoms, tested positive, and was discharged.  She presents today with persistent fevers.  Patient reports that since October 29 she has been experiencing fevers, initially a dry cough that has now turned productive, shortness of breath, weakness, and  decreased appetite.  She reports the fevers are as high as 103 and despite treating them with over-the-counter medications she has not been able to go without a fever so she came back to the emergency room for evaluation.  She admits she has never had a COVID-vaccine.       Past Medical/Surgical History:   No past medical history on file.  No past surgical history on file.    Family History:   No relevant family history has been documented for this patient.    Allergies:     Allergies   Allergen Reactions    Penicillins Hives     Social history:   reports that she has quit smoking. Her smoking use included cigarettes. She started smoking about 10 months ago. She has a 0.2 pack-year smoking history. She has never used smokeless tobacco.    Current Medications:   No recently discontinued medications to reconcile    Review of Systems:   General: denies weight gain, denies loss of appetite, fever, chills, night sweats.  HEENT: denies headaches, dizziness, head trauma, visual changes, eye pain, tinnitus, nosebleeds, hoarseness or throat pain    Respiratory: denies chest pain, dyspnea, cough and hemoptysis.   Cardiovascular: denies orthopnea, paroxysmal nocturnal dyspnea, leg swelling, and previous heart attack.    Gastrointestinal: denies pain, nausea vomiting, diarrhea, constipation, melena or bleeding.  Genitourinary: denies hematuria, frequency, urgency or dysuria  Neurology: denies syncope, seizures, paralysis, paraesthesia   Endocrine: denies polyuria, polydipsia, skin or hair changes, and heat or cold intolerance  Musculoskeletal: denies joint pain, swelling, arthritis or myalgia  Hematologic: denies bleeding, adenopathy and easy bruising  Skin: denies rashes and skin discoloration  Psychiatry: +anxiety    Physical Exam:   Vital Signs:   Vitals:    11/15/24 1206   BP: 110/58   Pulse: 88   Resp:    Temp: 36.5 °C (97.7 °F)   SpO2: 93%       Input/Output:    Intake/Output Summary (Last 24 hours) at 11/15/2024  1406  Last data filed at 11/15/2024 1314  Gross per 24 hour   Intake 450 ml   Output --   Net 450 ml       Oxygen requirements:    Ventilator Information:  FiO2 (%):  [21 %] 21 %    General appearance: Not in pain or distress, in no respiratory distress    HEENT: Atraumatic/normocephalic, EOMI, SHINE, pharynx clear, moist mucosa, redness of the uvula appreciated  Neck: Supple, no jugular venous distension, lymphadenopathy, thyromegaly or carotid bruits  Chest: normal breath sounds, no wheezing, no crackles and no tenderness over ribs   Cardiovascular: Normal S1, S2, regular rate and rhythm, no murmur, rub or gallop  Abdomen: Normal sounds present, soft, lax with no tenderness, no hepatosplenomegaly, and no masses  Extremities: No edema. Pulses are equally present.   Skin: intact, no rashes   Neurologic: Alert and oriented x 3, No focal deficit     Investigations:  Labs, radiological imaging and cardiac work up were personally reviewed.    This document was created using dragon dictation and may contain unintended error.      Keke Harper MD

## 2024-11-15 NOTE — PROGRESS NOTES
11/15/24 1037   Discharge Planning   Living Arrangements Spouse/significant other;Children   Support Systems Spouse/significant other;Children   Expected Discharge Disposition Home   Does the patient need discharge transport arranged? No     Patient remains with no anticipated home going needs.  Per attending note, ADOD today or tomorrow.  Care Coordination team following for potential need for home O2.  Venessa DILLON TCC

## 2024-11-15 NOTE — DISCHARGE SUMMARY
Discharge Diagnosis  Pneumonia of both lungs due to infectious organism, unspecified part of lung    Issues Requiring Follow-Up      Discharge Meds     Medication List      ASK your doctor about these medications     albuterol 90 mcg/actuation inhaler; Inhale 2 puffs every 4 hours if   needed for wheezing.   benzonatate 100 mg capsule; Commonly known as: Tessalon; Take 1 capsule   (100 mg) by mouth every 8 hours for 7 days. Do not crush or chew.; Ask   about: Should I take this medication?   ondansetron ODT 4 mg disintegrating tablet; Commonly known as:   Zofran-ODT; Take 1 tablet (4 mg) by mouth every 8 hours if needed for   nausea or vomiting for up to 7 days.; Ask about: Should I take this   medication?       Test Results Pending At Discharge  Pending Labs       No current pending labs.            Hospital Course   34 y.o. femaleWho denies any medical history who tested positive for COVID on October 29 and presented to the ED on November 1 with COVID symptoms, tested positive, and was discharged.  She presents today with persistent fevers.  Patient reports that since October 29 she has been experiencing fevers, initially a dry cough that has now turned productive, shortness of breath, weakness, and decreased appetite.  She reports the fevers are as high as 103 and despite treating them with over-the-counter medications she has not been able to go without a fever so she came back to the emergency room for evaluation.  She admits she has never had a COVID-vaccine.     In the ED lab work, EKG, CTA chest, and chest x-ray were performed. Labs revealed white count up to 8.9 from 4.3 on November 1, H&H 10.4 and 31.8 (12.1 and 35.3 on 11/1/2024), platelets 458, negative for flu and COVID.  Pregnancy test was negative.  EKG, per ER physician impression revealed Rate of 99 bpm, regular rhythm, normal axis, normal intervals, no evidence of ST segment elevations or depressions, no pattern T wave abnormalities.  Chest x-ray  revealed worsening bilateral airspace opacities concerning for worsening multifocal pneumonia, blunting of the left costophrenic angle suggestive of developing effusion and atelectasis continued radiographic follow-up to resolution is recommended.  CTA chest is pending.  Troponins were negative x 2.  She arrived tachycardic with a heart rate of 101, a low-grade fever of 37 2, and an SpO2 of 88%, blood pressure and respirations were within normal limits.  She was given Tylenol Zithromax and Rocephin and admitted for further evaluation and treatment under the care of Dr. Balbuena.     I went ahead and treated her for multi lobar infiltrates she was previously COVID-positive I believe this is where the infiltrates came from.  So far though she was stabilized and then I went ahead continue to treat her with steroids and also she was treated with remdesivir she was treated with antibiotics we completed the remdesivir we continued with the Decadron on the day of her discharge I went ahead and gave the orders for continued antibiotic therapy Zithromax 500 mg for 5 days prednisone 40 mg daily for 5 additional days follow-up with her primary care physician.      Finally I gave the instructions for continuing care I prepared all the discharge records I sent all the pertinent prescriptions that she needed over the course and I reviewed all the final labs I reviewed all of her previous medications and took care of that as well.  Discharge day management greater than 30 minutes total time thank you    Pertinent Physical Exam At Time of Discharge  Physical Exam  She was awake and alert and oriented x 3 her lungs were clear posteriorly her heart had a regular rate and rhythm abdomen is soft is not distended or firm  Outpatient Follow-Up  No future appointments.      Gene Balbuena DO

## 2024-11-16 LAB
ATRIAL RATE: 101 BPM
P AXIS: 33 DEGREES
PR INTERVAL: 124 MS
Q ONSET: 253 MS
QRS COUNT: 16 BEATS
QRS DURATION: 75 MS
QT INTERVAL: 332 MS
QTC CALCULATION(BAZETT): 426 MS
QTC FREDERICIA: 392 MS
R AXIS: 57 DEGREES
T AXIS: 29 DEGREES
T OFFSET: 419 MS
VENTRICULAR RATE: 99 BPM

## 2024-11-21 NOTE — DOCUMENTATION CLARIFICATION NOTE
"    PATIENT:               ESEQUIEL YAO  ACCT #:                  [id number]  MRN:                       [id number]  :                       1990  ADMIT DATE:       11/10/2024 3:26 PM  DISCH DATE:        11/15/2024 2:55 PM  RESPONDING PROVIDER #:        [id number]          PROVIDER RESPONSE TEXT:    COVID pneumonia is acute manifestation of prolonged active COVID-19 infection with superimposed multifocal pneumonia    CDI QUERY TEXT:    Clarification    Instruction:    Based on your assessment of the patient and the clinical information, please provide the requested documentation by clicking on the appropriate radio button and enter any additional information if prompted.    Question: Please further clarify the diagnosis of COVID Pneumonia    When answering this query, please exercise your independent professional judgment. The fact that a question is being asked, does not imply that any particular answer is desired or expected.    The patient's clinical indicators include:  Clinical Information: Patient with noted recent COVID    Clinical Indicators: Per ED, \"Pneumonia of both lungs due to infectious organism, unspecified part of lung.\"    Per H and P, \"tested positive for COVID on  and presented to the ED on  with COVID symptoms, tested positive, and was discharged.  She presents today with persistent fevers.  Patient reports that since  she has been experiencing fevers, initially a dry cough that has now turned productive, shortness of breath, weakness, and decreased appetite....Chest x-ray revealed worsening bilateral airspace opacities concerning for worsening multifocal pneumonia...    Acute hypoxic respiratory failure  Multifocal pneumonia  COVID-pneumonia.\"    Per d/c sum, \"Discharge Diagnosis: Pneumonia of both lungs due to infectious organism, unspecified part of lung...I went ahead and treated her for multi lobar infiltrates she was previously COVID-positive I believe " "this is where the infiltrates came from.\"    11/10 CXR, \"Worsening bilateral airspace opacities as described above concerning  for worsening multifocal pneumonia. Blunting of the left costophrenic  angle suggestive of developing effusion and atelectasis. Continued  radiographic follow-up to resolution is advised.\"    Per 11/10 CTA, \"Extensive pneumonia within all lobes of both lungs, most severe  within the lingula. Bilateral small parapneumonic pleural effusions.\"    Treatment: IV zithromax, IV rocephin, decadron, duonebs, remdesivir, CXR, chest CT    Risk Factors: 34yof with noted recent COVID  Options provided:  -- COVID pneumonia is acute manifestation of prolonged active COVID-19 infection  -- COVID pneumonia is acute manifestation of prolonged active COVID-19 infection with superimposed multifocal pneumonia  -- COVID pneumonia is acute manifestation of recent resolved COVID-19 infection  -- COVID pneumonia is acute manifestation of recent resolved COVID-19 infection with superimposed multifocal pneumonia  -- COVID-19 infection resolved and COVID pneumonia ruled out,  Multifocal Pneumonia is a sequelae of COVID-19  -- COVID-19 infection resolved and COVID pneumonia ruled out, Multifocal Pneumonia Manifestation is unrelated to COVID-19  -- Other - I will add my own diagnosis  -- Refer to Clinical Documentation Reviewer    Query created by: Nayana Castillo on 11/21/2024 5:18 PM      Electronically signed by:  FELIBERTO TEE DO 11/21/2024 5:40 PM          "